# Patient Record
Sex: MALE | Race: WHITE | Employment: FULL TIME | URBAN - METROPOLITAN AREA
[De-identification: names, ages, dates, MRNs, and addresses within clinical notes are randomized per-mention and may not be internally consistent; named-entity substitution may affect disease eponyms.]

---

## 2020-11-16 ENCOUNTER — APPOINTMENT (OUTPATIENT)
Dept: GENERAL RADIOLOGY | Age: 30
DRG: 871 | End: 2020-11-16
Payer: COMMERCIAL

## 2020-11-16 ENCOUNTER — HOSPITAL ENCOUNTER (INPATIENT)
Age: 30
LOS: 4 days | Discharge: HOME | DRG: 871 | End: 2020-11-20
Attending: EMERGENCY MEDICINE | Admitting: INTERNAL MEDICINE
Payer: COMMERCIAL

## 2020-11-16 PROBLEM — U07.1 PNEUMONIA DUE TO COVID-19 VIRUS: Status: ACTIVE | Noted: 2020-11-16

## 2020-11-16 PROBLEM — J12.82 PNEUMONIA DUE TO COVID-19 VIRUS: Status: ACTIVE | Noted: 2020-11-16

## 2020-11-16 PROBLEM — E87.1 HYPONATREMIA: Status: ACTIVE | Noted: 2020-11-16

## 2020-11-16 PROBLEM — R50.9 FEBRILE ILLNESS: Status: ACTIVE | Noted: 2020-11-16

## 2020-11-16 PROBLEM — D69.6 THROMBOCYTOPENIA (HCC): Status: ACTIVE | Noted: 2020-11-16

## 2020-11-16 LAB
ABSOLUTE EOS #: <0.03 K/UL (ref 0–0.44)
ABSOLUTE IMMATURE GRANULOCYTE: <0.03 K/UL (ref 0–0.3)
ABSOLUTE LYMPH #: 1.03 K/UL (ref 1.1–3.7)
ABSOLUTE MONO #: 0.28 K/UL (ref 0.1–1.2)
ALBUMIN SERPL-MCNC: 3.7 G/DL (ref 3.5–5.2)
ALBUMIN/GLOBULIN RATIO: 1.4 (ref 1–2.5)
ALP BLD-CCNC: 53 U/L (ref 40–129)
ALT SERPL-CCNC: 38 U/L (ref 5–41)
ANION GAP SERPL CALCULATED.3IONS-SCNC: 12 MMOL/L (ref 9–17)
AST SERPL-CCNC: 47 U/L
BASOPHILS # BLD: 0 % (ref 0–2)
BASOPHILS ABSOLUTE: <0.03 K/UL (ref 0–0.2)
BILIRUB SERPL-MCNC: 0.79 MG/DL (ref 0.3–1.2)
BILIRUBIN DIRECT: 0.23 MG/DL
BILIRUBIN, INDIRECT: 0.56 MG/DL (ref 0–1)
BUN BLDV-MCNC: 7 MG/DL (ref 6–20)
BUN/CREAT BLD: ABNORMAL (ref 9–20)
CALCIUM SERPL-MCNC: 8.4 MG/DL (ref 8.6–10.4)
CHLORIDE BLD-SCNC: 98 MMOL/L (ref 98–107)
CO2: 21 MMOL/L (ref 20–31)
CREAT SERPL-MCNC: 0.7 MG/DL (ref 0.7–1.2)
D-DIMER QUANTITATIVE: 0.76 MG/L FEU
DIFFERENTIAL TYPE: ABNORMAL
EOSINOPHILS RELATIVE PERCENT: 0 % (ref 1–4)
FERRITIN: 2740 UG/L (ref 30–400)
FIBRINOGEN: 484 MG/DL (ref 140–420)
GFR AFRICAN AMERICAN: >60 ML/MIN
GFR NON-AFRICAN AMERICAN: >60 ML/MIN
GFR SERPL CREATININE-BSD FRML MDRD: ABNORMAL ML/MIN/{1.73_M2}
GFR SERPL CREATININE-BSD FRML MDRD: ABNORMAL ML/MIN/{1.73_M2}
GLOBULIN: ABNORMAL G/DL (ref 1.5–3.8)
GLUCOSE BLD-MCNC: 120 MG/DL (ref 70–99)
HCT VFR BLD CALC: 45.9 % (ref 40.7–50.3)
HEMOGLOBIN: 16.5 G/DL (ref 13–17)
IMMATURE GRANULOCYTES: 0 %
LACTIC ACID, WHOLE BLOOD: 1.3 MMOL/L (ref 0.7–2.1)
LYMPHOCYTES # BLD: 23 % (ref 24–43)
MCH RBC QN AUTO: 30.5 PG (ref 25.2–33.5)
MCHC RBC AUTO-ENTMCNC: 35.9 G/DL (ref 28.4–34.8)
MCV RBC AUTO: 84.8 FL (ref 82.6–102.9)
MONOCYTES # BLD: 6 % (ref 3–12)
NRBC AUTOMATED: 0 PER 100 WBC
PDW BLD-RTO: 11.7 % (ref 11.8–14.4)
PLATELET # BLD: ABNORMAL K/UL (ref 138–453)
PLATELET ESTIMATE: ABNORMAL
PLATELET, FLUORESCENCE: 111 K/UL (ref 138–453)
PLATELET, IMMATURE FRACTION: 4.1 % (ref 1.1–10.3)
PMV BLD AUTO: ABNORMAL FL (ref 8.1–13.5)
POTASSIUM SERPL-SCNC: 3.7 MMOL/L (ref 3.7–5.3)
RBC # BLD: 5.41 M/UL (ref 4.21–5.77)
RBC # BLD: ABNORMAL 10*6/UL
SARS-COV-2, RAPID: DETECTED
SARS-COV-2: ABNORMAL
SARS-COV-2: ABNORMAL
SEG NEUTROPHILS: 70 % (ref 36–65)
SEGMENTED NEUTROPHILS ABSOLUTE COUNT: 3.14 K/UL (ref 1.5–8.1)
SODIUM BLD-SCNC: 131 MMOL/L (ref 135–144)
SOURCE: ABNORMAL
TOTAL PROTEIN: 6.4 G/DL (ref 6.4–8.3)
TROPONIN INTERP: NORMAL
TROPONIN T: NORMAL NG/ML
TROPONIN, HIGH SENSITIVITY: <6 NG/L (ref 0–22)
WBC # BLD: 4.5 K/UL (ref 3.5–11.3)
WBC # BLD: ABNORMAL 10*3/UL

## 2020-11-16 PROCEDURE — 85055 RETICULATED PLATELET ASSAY: CPT

## 2020-11-16 PROCEDURE — 6370000000 HC RX 637 (ALT 250 FOR IP): Performed by: FAMILY MEDICINE

## 2020-11-16 PROCEDURE — 71045 X-RAY EXAM CHEST 1 VIEW: CPT

## 2020-11-16 PROCEDURE — 99222 1ST HOSP IP/OBS MODERATE 55: CPT | Performed by: FAMILY MEDICINE

## 2020-11-16 PROCEDURE — 85025 COMPLETE CBC W/AUTO DIFF WBC: CPT

## 2020-11-16 PROCEDURE — 2580000003 HC RX 258: Performed by: FAMILY MEDICINE

## 2020-11-16 PROCEDURE — 99284 EMERGENCY DEPT VISIT MOD MDM: CPT

## 2020-11-16 PROCEDURE — 80076 HEPATIC FUNCTION PANEL: CPT

## 2020-11-16 PROCEDURE — 85379 FIBRIN DEGRADATION QUANT: CPT

## 2020-11-16 PROCEDURE — 6360000002 HC RX W HCPCS: Performed by: STUDENT IN AN ORGANIZED HEALTH CARE EDUCATION/TRAINING PROGRAM

## 2020-11-16 PROCEDURE — 80048 BASIC METABOLIC PNL TOTAL CA: CPT

## 2020-11-16 PROCEDURE — 96374 THER/PROPH/DIAG INJ IV PUSH: CPT

## 2020-11-16 PROCEDURE — 2580000003 HC RX 258: Performed by: INTERNAL MEDICINE

## 2020-11-16 PROCEDURE — 6360000002 HC RX W HCPCS: Performed by: FAMILY MEDICINE

## 2020-11-16 PROCEDURE — 82728 ASSAY OF FERRITIN: CPT

## 2020-11-16 PROCEDURE — U0002 COVID-19 LAB TEST NON-CDC: HCPCS

## 2020-11-16 PROCEDURE — 2060000000 HC ICU INTERMEDIATE R&B

## 2020-11-16 PROCEDURE — 6370000000 HC RX 637 (ALT 250 FOR IP): Performed by: STUDENT IN AN ORGANIZED HEALTH CARE EDUCATION/TRAINING PROGRAM

## 2020-11-16 PROCEDURE — 85384 FIBRINOGEN ACTIVITY: CPT

## 2020-11-16 PROCEDURE — 83605 ASSAY OF LACTIC ACID: CPT

## 2020-11-16 PROCEDURE — 99255 IP/OBS CONSLTJ NEW/EST HI 80: CPT | Performed by: INTERNAL MEDICINE

## 2020-11-16 PROCEDURE — 2500000003 HC RX 250 WO HCPCS: Performed by: INTERNAL MEDICINE

## 2020-11-16 PROCEDURE — 2580000003 HC RX 258: Performed by: STUDENT IN AN ORGANIZED HEALTH CARE EDUCATION/TRAINING PROGRAM

## 2020-11-16 PROCEDURE — 84484 ASSAY OF TROPONIN QUANT: CPT

## 2020-11-16 PROCEDURE — 93005 ELECTROCARDIOGRAM TRACING: CPT | Performed by: STUDENT IN AN ORGANIZED HEALTH CARE EDUCATION/TRAINING PROGRAM

## 2020-11-16 RX ORDER — ONDANSETRON 2 MG/ML
4 INJECTION INTRAMUSCULAR; INTRAVENOUS EVERY 6 HOURS PRN
Status: DISCONTINUED | OUTPATIENT
Start: 2020-11-16 | End: 2020-11-20 | Stop reason: HOSPADM

## 2020-11-16 RX ORDER — KETOROLAC TROMETHAMINE 30 MG/ML
30 INJECTION, SOLUTION INTRAMUSCULAR; INTRAVENOUS ONCE
Status: COMPLETED | OUTPATIENT
Start: 2020-11-16 | End: 2020-11-16

## 2020-11-16 RX ORDER — 0.9 % SODIUM CHLORIDE 0.9 %
1000 INTRAVENOUS SOLUTION INTRAVENOUS ONCE
Status: COMPLETED | OUTPATIENT
Start: 2020-11-16 | End: 2020-11-16

## 2020-11-16 RX ORDER — ACETAMINOPHEN 650 MG/1
650 SUPPOSITORY RECTAL EVERY 6 HOURS PRN
Status: DISCONTINUED | OUTPATIENT
Start: 2020-11-16 | End: 2020-11-20 | Stop reason: HOSPADM

## 2020-11-16 RX ORDER — GUAIFENESIN DEXTROMETHORPHAN HYDROBROMIDE ORAL SOLUTION 10; 100 MG/5ML; MG/5ML
5 SOLUTION ORAL EVERY 4 HOURS PRN
Status: DISCONTINUED | OUTPATIENT
Start: 2020-11-16 | End: 2020-11-20 | Stop reason: HOSPADM

## 2020-11-16 RX ORDER — SODIUM CHLORIDE 0.9 % (FLUSH) 0.9 %
10 SYRINGE (ML) INJECTION EVERY 12 HOURS SCHEDULED
Status: DISCONTINUED | OUTPATIENT
Start: 2020-11-16 | End: 2020-11-20 | Stop reason: HOSPADM

## 2020-11-16 RX ORDER — POLYETHYLENE GLYCOL 3350 17 G/17G
17 POWDER, FOR SOLUTION ORAL DAILY PRN
Status: DISCONTINUED | OUTPATIENT
Start: 2020-11-16 | End: 2020-11-20 | Stop reason: HOSPADM

## 2020-11-16 RX ORDER — 0.9 % SODIUM CHLORIDE 0.9 %
30 INTRAVENOUS SOLUTION INTRAVENOUS PRN
Status: DISCONTINUED | OUTPATIENT
Start: 2020-11-16 | End: 2020-11-20 | Stop reason: HOSPADM

## 2020-11-16 RX ORDER — MAGNESIUM SULFATE 1 G/100ML
1 INJECTION INTRAVENOUS PRN
Status: DISCONTINUED | OUTPATIENT
Start: 2020-11-16 | End: 2020-11-20 | Stop reason: HOSPADM

## 2020-11-16 RX ORDER — ACETAMINOPHEN 325 MG/1
650 TABLET ORAL ONCE
Status: COMPLETED | OUTPATIENT
Start: 2020-11-16 | End: 2020-11-16

## 2020-11-16 RX ORDER — PROMETHAZINE HYDROCHLORIDE 12.5 MG/1
12.5 TABLET ORAL EVERY 6 HOURS PRN
Status: DISCONTINUED | OUTPATIENT
Start: 2020-11-16 | End: 2020-11-20 | Stop reason: HOSPADM

## 2020-11-16 RX ORDER — POTASSIUM CHLORIDE 20 MEQ/1
40 TABLET, EXTENDED RELEASE ORAL PRN
Status: DISCONTINUED | OUTPATIENT
Start: 2020-11-16 | End: 2020-11-20 | Stop reason: HOSPADM

## 2020-11-16 RX ORDER — OMEPRAZOLE 20 MG/1
20 CAPSULE, DELAYED RELEASE ORAL DAILY
COMMUNITY

## 2020-11-16 RX ORDER — ACETAMINOPHEN 325 MG/1
650 TABLET ORAL EVERY 6 HOURS PRN
Status: DISCONTINUED | OUTPATIENT
Start: 2020-11-16 | End: 2020-11-20 | Stop reason: HOSPADM

## 2020-11-16 RX ORDER — SODIUM CHLORIDE 0.9 % (FLUSH) 0.9 %
10 SYRINGE (ML) INJECTION PRN
Status: DISCONTINUED | OUTPATIENT
Start: 2020-11-16 | End: 2020-11-20 | Stop reason: HOSPADM

## 2020-11-16 RX ORDER — POTASSIUM CHLORIDE 7.45 MG/ML
10 INJECTION INTRAVENOUS PRN
Status: DISCONTINUED | OUTPATIENT
Start: 2020-11-16 | End: 2020-11-20 | Stop reason: HOSPADM

## 2020-11-16 RX ADMIN — SODIUM CHLORIDE 1000 ML: 9 INJECTION, SOLUTION INTRAVENOUS at 11:03

## 2020-11-16 RX ADMIN — KETOROLAC TROMETHAMINE 30 MG: 30 INJECTION, SOLUTION INTRAMUSCULAR at 11:02

## 2020-11-16 RX ADMIN — REMDESIVIR 200 MG: 100 INJECTION, POWDER, LYOPHILIZED, FOR SOLUTION INTRAVENOUS at 18:46

## 2020-11-16 RX ADMIN — SODIUM CHLORIDE, PRESERVATIVE FREE 10 ML: 5 INJECTION INTRAVENOUS at 20:30

## 2020-11-16 RX ADMIN — ACETAMINOPHEN 650 MG: 325 TABLET ORAL at 16:45

## 2020-11-16 RX ADMIN — ACETAMINOPHEN 650 MG: 325 TABLET ORAL at 23:00

## 2020-11-16 RX ADMIN — ENOXAPARIN SODIUM 30 MG: 30 INJECTION SUBCUTANEOUS at 16:45

## 2020-11-16 RX ADMIN — ACETAMINOPHEN 650 MG: 325 TABLET ORAL at 11:02

## 2020-11-16 SDOH — HEALTH STABILITY: PHYSICAL HEALTH: ON AVERAGE, HOW MANY MINUTES DO YOU ENGAGE IN EXERCISE AT THIS LEVEL?: 20 MIN

## 2020-11-16 SDOH — HEALTH STABILITY: PHYSICAL HEALTH: ON AVERAGE, HOW MANY DAYS PER WEEK DO YOU ENGAGE IN MODERATE TO STRENUOUS EXERCISE (LIKE A BRISK WALK)?: 2 DAYS

## 2020-11-16 SDOH — HEALTH STABILITY: MENTAL HEALTH: HOW OFTEN DO YOU HAVE A DRINK CONTAINING ALCOHOL?: 2-4 TIMES A MONTH

## 2020-11-16 ASSESSMENT — ENCOUNTER SYMPTOMS
BACK PAIN: 0
CONSTIPATION: 0
SHORTNESS OF BREATH: 1
COLOR CHANGE: 1
CHEST TIGHTNESS: 0
COUGH: 1
VOMITING: 0
NAUSEA: 0
ABDOMINAL PAIN: 0
RHINORRHEA: 0
WHEEZING: 0
DIARRHEA: 0
CHOKING: 0
PHOTOPHOBIA: 0
APNEA: 0
EYE ITCHING: 0
SINUS PRESSURE: 0
VOICE CHANGE: 0
COUGH: 0
DIARRHEA: 1

## 2020-11-16 ASSESSMENT — PAIN SCALES - GENERAL
PAINLEVEL_OUTOF10: 8
PAINLEVEL_OUTOF10: 0

## 2020-11-16 NOTE — ED TRIAGE NOTES
Pt to ED c/o SOB and chest pain. Pt stated he has positive Covid swab 5 days ago, symptoms began 8 days ago. Pt stated symptoms are worse today. Pt is febrile upon arrival and tachycardic. Pt stated he's been taking tylenol/motrin for fever without symptom relief. Pt has nonproductive cough. Pt denies abd pain or nausea/vomiting. Pt spO2 at 94% on room air, dropping to 90% with exertion. Pt resting on stretcher, NAD noted. Dr. Poly Capellan at bedside.

## 2020-11-16 NOTE — ED PROVIDER NOTES
[facility]  ED  Emergency Department Encounter  Emergency Medicine Resident     Pt Name: Lela Richards  MRN: [id number]  [place] 1990  Date of evaluation: 11/16/20  PCP:  No primary care provider on file. CHIEF COMPLAINT       Chief Complaint   Patient presents with    Shortness of Breath    Chest Pain       HISTORY OFPRESENT ILLNESS  (Location/Symptom, Timing/Onset, Context/Setting, Quality, Duration, Modifying Factors,Severity.)      Lela Richards is a 26-year-old male who presents with shortness of breath and chest pain. The patient reports that he had a positive Covid swab about 5 days ago. Says since then he is having worsening shortness of breath and right-sided chest pain. He has been taking Tylenol and Motrin at home for fever relief but reports his fevers have been as high as 103 at home. Is also taken NyQuil for symptomatic relief. No recent travel. No sick contacts at home. He does have intermittent productive cough and loose stools. No recent surgeries. PAST MEDICAL / SURGICAL / SOCIAL / FAMILY HISTORY      has a past medical history of Hx SBO. has a past surgical history that includes Tonsillectomy and adenoidectomy (1996); Appendectomy (2005); and Abdominal exploration surgery (2016). Social History     Socioeconomic History    Marital status:      Spouse name: Not on file    Number of children: Not on file    Years of education: Not on file    Highest education level: Not on file   Occupational History    Occupation: MANAGER     Employer: [employer] Financial resource strain: Not on file    Food insecurity     Worry: Not on file     Inability: Not on file   [email or web address] needs     Medical: Not on file     Non-medical: Not on file   Tobacco Use    Smoking status: Never Smoker    Smokeless tobacco: Never Used   Substance and Sexual Activity    Alcohol use:  Yes     Alcohol/week: 2.0 - 3.0 standard drinks     Types: 2 - 3 °F (39.9 °C) Oral 132 21 96 %         Physical Exam  Constitutional:       General: He is not in acute distress. Appearance: He is not diaphoretic. HENT:      Mouth/Throat:      Mouth: Mucous membranes are moist.   Eyes:      Extraocular Movements: Extraocular movements intact. Conjunctiva/sclera: Conjunctivae normal.      Pupils: Pupils are equal, round, and reactive to light. Neck:      Musculoskeletal: Neck supple. Cardiovascular:      Rate and Rhythm: Regular rhythm. Tachycardia present. Pulses: Normal pulses. Pulmonary:      Effort: Pulmonary effort is normal.      Breath sounds: No wheezing. Comments: Bilateral rhonchi  Skin:     General: Skin is warm. Neurological:      Mental Status: He is alert and oriented to person, place, and time.          DIFFERENTIAL  DIAGNOSIS     PLAN (LABS / IMAGING / EKG):  Orders Placed This Encounter   Procedures    XR CHEST PORTABLE    CBC WITH AUTO DIFFERENTIAL    BASIC METABOLIC PANEL    BZDVY-13    LACTIC ACID, WHOLE BLOOD    Troponin    Immature Platelet Fraction    Basic Metabolic Panel w/ Reflex to MG    CBC    Protime-INR    D-Dimer, Quantitative    DIET GENERAL;    Vital signs per unit routine    Notify physician    Up with assistance    Daily weights    Intake and output    Place intermittent pneumatic compression device    PPE Instructions    Telemetry Monitoring    Full Code    Inpatient consult to Hospitalist    Inpatient consult to Infectious Diseases    Droplet Plus Isolation    Initiate Oxygen Therapy Protocol    Pulse Oximetry Spot Check    EKG 12 Lead    PATIENT STATUS (FROM ED OR OR/PROCEDURAL) Inpatient       MEDICATIONS ORDERED:  Orders Placed This Encounter   Medications    0.9 % sodium chloride bolus    ketorolac (TORADOL) injection 30 mg    acetaminophen (TYLENOL) tablet 650 mg    sodium chloride flush 0.9 % injection 10 mL    sodium chloride flush 0.9 % injection 10 mL    OR Linked Order Group  potassium chloride (KLOR-CON M) extended release tablet 40 mEq     potassium bicarb-citric acid (EFFER-K) effervescent tablet 40 mEq     potassium chloride 10 mEq/100 mL IVPB (Peripheral Line)    magnesium sulfate 1 g in dextrose 5% 100 mL IVPB    OR Linked Order Group     acetaminophen (TYLENOL) tablet 650 mg     acetaminophen (TYLENOL) suppository 650 mg    polyethylene glycol (GLYCOLAX) packet 17 g    OR Linked Order Group     promethazine (PHENERGAN) tablet 12.5 mg     ondansetron (ZOFRAN) injection 4 mg    enoxaparin (LOVENOX) injection 30 mg    enoxaparin (LOVENOX) injection 30 mg    dextromethorphan-guaiFENesin (ROBITUSSIN-DM)  MG/5ML liquid 5 mL         Initial MDM/Plan: 26 y.o. male who presents with shortness of breath, fever at home and chest pain. Patient on arrival saturating low 90s. He was tachycardic to the 120s. Febrile to 103.8. On physical exam, the patient is able to complete full sentences. Lungs have bilateral rhonchi. No lower extremity edema. Intact distal pulses. Given the patient's fever, tachycardia and low sats, will get basic lab work and chest x-ray. We will repeat Covid swab here as the patient likely requires admission. We will give the patient Toradol, acetaminophen and normal saline bolus for symptomatic relief. The patient did desat to 89% on room air. Only able to march in place for approximately 15 seconds before becoming tachycardic to 140s and dyspneic.     DIAGNOSTIC RESULTS / EMERGENCYDEPARTMENT COURSE / MDM     LABS:  Labs Reviewed   CBC WITH AUTO DIFFERENTIAL - Abnormal; Notable for the following components:       Result Value    MCHC 35.9 (*)     RDW 11.7 (*)     Seg Neutrophils 70 (*)     Lymphocytes 23 (*)     Eosinophils % 0 (*)     Absolute Lymph # 1.03 (*)     All other components within normal limits   BASIC METABOLIC PANEL - Abnormal; Notable for the following components:    Glucose 120 (*)     Calcium 8.4 (*)     Sodium 131 (*) All other components within normal limits   COVID-19 - Abnormal; Notable for the following components:    SARS-CoV-2, Rapid DETECTED (*)     All other components within normal limits   IMMATURE PLATELET FRACTION - Abnormal; Notable for the following components:    Platelet, Fluorescence 111 (*)     All other components within normal limits   LACTIC ACID, WHOLE BLOOD   TROPONIN   D-DIMER, QUANTITATIVE         RADIOLOGY:  Xr Chest Portable    Result Date: 11/16/2020  EXAMINATION: ONE XRAY VIEW OF THE CHEST 11/16/2020 11:50 am COMPARISON: None. HISTORY: ORDERING SYSTEM PROVIDED HISTORY: covid+ TECHNOLOGIST PROVIDED HISTORY: covid+ FINDINGS: The heart size is at the upper limits of normal.  Bilateral ill-defined pulmonary opacities. No pneumothorax. No pleural effusion. Bilateral ill-defined pulmonary opacities suggesting atypical pneumonia         EKG  EKG Interpretation    Interpreted by emergency department physician    Rhythm: normal sinus   Rate: tachycardia  Axis: normal  Ectopy: none  Conduction: normal  ST Segments: no acute change  T Waves: no acute change  Q Waves: none    Clinical Impression: no acute changes    [place]      All EKG's are interpreted by the Emergency Department Physicianwho either signs or Co-signs this chart in the absence of a cardiologist.    EMERGENCY DEPARTMENT COURSE:      Covid swab positive. Chest x-ray showing bilateral pneumonia. Patient saturations remained low 90s. He also remained febrile to 101.8 after Tylenol and Toradol. To receive a normal saline bolus with improvement in his heart rate to the low 100s. Will admit patient to [facility].     PROCEDURES:  None    CONSULTS:  IP CONSULT TO HOSPITALIST  IP CONSULT TO INFECTIOUS DISEASES    CRITICAL CARE:  Please see attending note    FINAL IMPRESSION      1. COVID-19 virus infection          DISPOSITION / [address] Admitted 11/16/2020 01:08:39 PM      PATIENT REFERRED TO:  No follow-up provider specified.     DISCHARGE MEDICATIONS:  Current Discharge Medication List          Nemo Bella MD  Emergency Medicine Resident    (Please note that portions of this note were completed with a voice recognition program.Efforts were made to edit the dictations but occasionally words are mis-transcribed.)        Nemo Bella MD  Resident  11/16/20 1168

## 2020-11-16 NOTE — CONSULTS
[facility] -   Infectious diseases evaluation  admission date 11/16/2020    reason for consultation:   covid pneumonia     Impression :   Current:  · covid pneumonia w hypoxia -    Other:  ·   Discussion / summary of stay / plan of care   ·   Recommendations   · Get inflammatory markers and LFT  · Start 5 days remdesevir  · FU LFT  · Came day 8 of illness -might benefit from steroids and plasma if inflamm markers elevated  · Disc w pt and RN    Infection Control Recommendations   · [place] Precautions  · Contact Isolation   · Airborne isolation  · Droplet Isolation      Antimicrobial Stewardship Recommendations   · Simplification of therapy  · Targeted therapy    Coordination ofOutpatient Care:   · Estimated Length of IV antimicrobials:  · Patient will need Midline / picc Catheter Insertion:   · Patient will need SNF:  · Patient will need outpatient wound care:     History of Present Illness:   Initial history:  Nancy Osman is a 26y.o.-year-old male SOB and chest pain fever x 7 days. Fatigue poor appetite and COVOID test + 4 days PTA.   Initially had HA then resolved - no diarrhea  Due to fatigue and feeling worse, admitted to ER - after moving So2 drops to 30 -89%  Lymphopenia on exam  bilat atypical pneumonia on CXR  Admitted  We are starting Remdesevir      Interval changes  11/16/2020   Patient Vitals for the past 8 hrs:   BP Temp Temp src Pulse Resp SpO2 Height Weight   11/16/20 1643 -- 101.8 °F (38.8 °C) Oral -- -- -- -- --   11/16/20 1453 -- -- -- 91 -- -- -- --   11/16/20 1449 114/66 -- -- 91 -- -- -- --   11/16/20 1430 114/66 100 °F (37.8 °C) Oral 91 13 90 % 5' 10\" (1.778 m) 244 lb 12.8 oz (111 kg)   11/16/20 1400 115/81 -- -- 93 29 90 % -- --   11/16/20 1230 118/81 101.8 °F (38.8 °C) -- 103 19 91 % -- --   11/16/20 1145 120/89 -- -- 108 19 92 % -- --   11/16/20 1115 124/86 -- -- 129 27 97 % -- --   11/16/20 1045 127/88 103.4 °F (39.7 °C) Oral 111 21 93 % -- --   11/16/20 1032 -- 103.8 °F (39.9 °C) Oral 132 -- 96 % -- --       Summary of relevant labs:  Labs:    Micro:    Imaging:      I have personally reviewed the past medical history, past surgical history, medications, social history, and family history, and I haveupdated the database accordingly. Allergies:   Penicillins     Review of Systems:     Review of Systems   Constitutional: Positive for activity change, appetite change, fatigue and fever. HENT: Positive for congestion. Eyes: Negative for photophobia and itching. Respiratory: Positive for cough and shortness of breath. Negative for apnea. Cardiovascular: Positive for chest pain. Gastrointestinal: Negative for abdominal pain. Endocrine: Negative for polyuria. Genitourinary: Negative for dysuria. Musculoskeletal: Negative for arthralgias. Skin: Positive for color change. Allergic/Immunologic: Negative for immunocompromised state. Neurological: Negative for dizziness. Hematological: Negative for adenopathy. Psychiatric/Behavioral: Negative for agitation. Physical Examination :       Physical Exam  Constitutional:       General: He is not in acute distress. Appearance: Normal appearance. He is ill-appearing. HENT:      Head: Normocephalic and atraumatic. Nose: Nose normal. No congestion. Mouth/Throat:      Mouth: Mucous membranes are moist.   Eyes:      Conjunctiva/sclera: Conjunctivae normal.   Neck:      Musculoskeletal: Neck supple. No neck rigidity. Cardiovascular:      Rate and Rhythm: Normal rate and regular rhythm. Heart sounds: Normal heart sounds. No friction rub. Pulmonary:      Effort: No respiratory distress. Breath sounds: Normal breath sounds. Abdominal:      Palpations: Abdomen is soft. Tenderness: There is no abdominal tenderness. Genitourinary:     Comments: No colon  Musculoskeletal:         General: No swelling or tenderness. Skin:     General: Skin is dry. Coloration: Skin is not jaundiced. Neurological:      General: No focal deficit present. Mental Status: He is alert and oriented to person, place, and time. Psychiatric:         Mood and Affect: Mood normal.         Thought Content: Thought content normal.         Past Medical History:     Past Medical History:   Diagnosis Date    Hx SBO 2006       Past Surgical  History:     Past Surgical History:   Procedure Laterality Date    ABDOMINAL EXPLORATION SURGERY  2016    SBO -    APPENDECTOMY  2005    TONSILLECTOMY AND ADENOIDECTOMY  1996       Medications:      sodium chloride flush  10 mL Intravenous 2 times per day    enoxaparin  30 mg Subcutaneous BID       Social History:     Social History     Socioeconomic History    Marital status:      Spouse name: Not on file    Number of children: Not on file    Years of education: Not on file    Highest education level: Not on file   Occupational History    Occupation: MANAGER     Employer: [employer] Financial resource strain: Not on file    Food insecurity     Worry: Not on file     Inability: Not on file   [employer] needs     Medical: Not on file     Non-medical: Not on file   Tobacco Use    Smoking status: Never Smoker    Smokeless tobacco: Never Used   Substance and Sexual Activity    Alcohol use:  Yes     Alcohol/week: 2.0 - 3.0 standard drinks     Types: 2 - 3 Cans of beer per week     Frequency: 2-4 times a month    Drug use: Never    Sexual activity: Yes     Partners: Female   Lifestyle    Physical activity     Days per week: 2 days     Minutes per session: 20 min    Stress: Not on file   Relationships    Social connections     Talks on phone: Not on file     Gets together: Not on file     Attends Sikh service: Not on file     Active member of club or organization: Not on file     Attends meetings of clubs or organizations: Not on file     Relationship status: Not on file    Intimate partner violence Fear of current or ex partner: Not on file     Emotionally abused: Not on file     Physically abused: Not on file     Forced sexual activity: Not on file   Other Topics Concern    Not on file   Social History Narrative    Not on file       Family History:     Family History   Problem Relation Age of Onset    Ovarian Cancer Paternal Aunt       Medical Decision Making:   I have independently reviewed/ordered the following labs:    CBC with Differential:   Recent Labs     11/16/20  1106   WBC 4.5   HGB 16.5   HCT 45.9   PLT See Reflexed IPF Result   LYMPHOPCT 23*   MONOPCT 6     BMP:  Recent Labs     11/16/20  1106   *   K 3.7   CL 98   CO2 21   BUN 7   CREATININE 0.70     Hepatic Function Panel: No results for input(s): PROT, LABALBU, BILIDIR, IBILI, BILITOT, ALKPHOS, ALT, AST in the last 72 hours. No results for input(s): RPR in the last 72 hours. No results for input(s): HIV in the last 72 hours. No results for input(s): BC in the last 72 hours. Lab Results   Component Value Date    CREATININE 0.70 11/16/2020    GLUCOSE 120 11/16/2020       Detailed results: Thank you for allowing us to participate in the care of this patient. Please call with questions. This note is created with the assistance of a speech recognition program.  While intending to generate adocument that actually reflects the content of the visit, the document can still have some errors including those of syntax and sound a like substitutions which may escape proof reading. It such instances, actual meaningcan be extrapolated by contextual diversion.     Peggy Kramer MD  Office: [phone]  Perfect serve / office [phone]

## 2020-11-16 NOTE — PROGRESS NOTES
Pt admitted into room 2026 for COVID Pneumonia. Pt is A&O x 4, able to GREEN x 4, no distress noted. Pt has #20 in [facility] [place], operated by [facility] from ED. Denies CP, SOB, Dizziness at this time. Pt staqtes \"I just have chest pain when I cough. \"     Pt oriented to room, answered all questions at this time. Educated about COVID and precautions. Call light in reach.

## 2020-11-16 NOTE — ED NOTES
ED to inpatient nurses report    Chief Complaint   Patient presents with    Shortness of Breath    Chest Pain      Present to ED from home  LOC: A&Ox4  Vital signs   Vitals:    11/16/20 1045 11/16/20 1115 11/16/20 1145 11/16/20 1230   BP: 127/88 124/86 120/89 118/81   Pulse: 111 129 108 103   Resp: 21 27 19 19   Temp: 103.4 °F (39.7 °C)   101.8 °F (38.8 °C)   TempSrc: Oral      SpO2: 93% 97% 92% 91%      Oxygen Baseline room air    Current needs required n/a  LDAs:   Peripheral IV 11/16/20 Right Antecubital (Active)     Mobility: Independent  Pending ED orders: n/a  Present condition: stable  Code Status: [unfilled]   Consults:  []  Hospitalist  Completed  [] yes [] no  []  Medicine  Completed  [] yes [] No  []  Cardiology  Completed  [] yes [] No  []  GI   Completed  [] yes [] No  []  Neurology  Completed  [] yes [] No  []  Nephrology Completed  [] yes [] No  []  Vascular  Completed  [] yes [] No   []  Surgery  Completed  [] yes [] No   []  Urology  Completed  [] yes [] No   []  Plastics  Completed  [] yes [] No   []  ENT  Completed  [] yes [] No   []  Other   Completed  [] yes [] No  Pertinent event(s) Pt admitted for pneumonia and COVID+. Pt c/o SOB and fevers. Pt initial temp 103.5, after meds pt down to 101.8. Pt on room air spO2 at 94%.   Pertinent event(s) n/a  Electronically signed by Arlene Rivera RN on 11/16/2020 at 1:31 PM       Arlene Rivera RN  11/16/20 [id number]

## 2020-11-16 NOTE — CARE COORDINATION
Case Management Initial Discharge Plan  Tommy Lozada,             Talked with:patienton the phone to discuss discharge plans. Information verified: address, contacts, phone number, , insurance Yes    Emergency Contact/Next of Kin name & number: galileo Soriano [phone]    PCP: Ulis Essex II  Date of last visit: 6 months ago    Insurance Provider: [facility] [place]    Discharge Planning    Living Arrangements:  Spouse/Significant Other, Children   Support Systems:  Spouse/Significant Other, Children    Home has one story    Patient able to perform ADL's:Independent    Current Services (outpatient & in home) none  DME equipment: none  DME provider: n/a    Receiving oral anticoagulation therapy? No    If indicated:   Physician managing anticoagulation treatment:   Where does patient obtain lab work for ATC treatment? Potential Assistance Needed:  N/A    Patient agreeable to home care: No  [place] of choice provided:  n/a    Prior SNF/Rehab Placement and Facility: N/A  Agreeable to SNF/Rehab: No  [place] of choice provided: n/a     Evaluation: n/a    Expected Discharge date:  20    Patient expects to be discharged to:  home  Follow Up Appointment: Best Day/ Time: Thursday AM    Transportation provider: wife  Transportation arrangements needed for discharge: No    Readmission Risk              Risk of Unplanned Readmission:        10             Does patient have a readmission risk score greater than 14?: No  If yes, follow-up appointment must be made within 7 days of discharge.      Goals of Care: breathe easier      Discharge Plan: home with family    [facility]          Electronically signed by Amol Grossman RN on 20 at 4:33 PM EST

## 2020-11-16 NOTE — H&P
[facility]  Office: [address], DO, Rudi Hernandez, DO, Marva Moreau, DO, Tomasa Trammell, DO, Fabi Tom MD, Kylie Pacheco MD, Jenna Quijano MD, Sarah Moore MD, Jiles Alpers, MD, Meredith Cerna MD, Juni Carter MD, Christine Talbert MD, Lambert Alvarado MD, Samreen Ruano, DO, Melo Wagner MD, Izzy Zarco MD, Brandy Decker, DO, Zara Bartholomew MD,  Lillian Dugan DO, Thomas Tabares MD, Bridgette Dean MD, Abhi Carter, [facility], [address], [facility], Nedra Labina, CNP, Juanis Edge, CNS, Erwin Horowitz, CNP, Arthur Ports, CNP, Valentina Mueller, CNP, Adiel Grjeni, CNP, Thong Lantigua, CNP, Thierno Webb PA-C, Kerry Hendricks, [facility], Deepthi Handley, CNP, Derrick Reed, CNP, Miguelito Willson, CNP, Michael Prabhakar, CNP, Jerry Sidhuin, [address] [address]      HISTORY AND PHYSICAL EXAMINATION            Date:   11/16/2020  Patient name:  Christian Joyce  Date of admission:  11/16/2020 10:32 AM  MRN:   [id number]  Account:  [de-identified]  YOB: 1990  PCP:    No primary care provider on file. Room:   /  Code Status:    No Order Full code     Chief Complaint:     Chief Complaint   Patient presents with    Shortness of Breath    Chest Pain       History Obtained From:     patient, electronic medical record    History of Present Illness: The patient is a 26 y.o. Non-/non  male who presents with Shortness of Breath and Chest Pain   and he is admitted to the hospital for the management of  Pneumonia due to COVID-19 virus. Patient was brought to emergency room by family after he had persistent fever for last 7 days. Patient was diagnosed with COVID-19 infection 4 days ago at [facility].  He reported high-grade fever at home and has been using Tylenol around-the-clock. Patient reported fatigue, low appetite, decrease in energy. He reported that he was progressively getting short of breath.   Patient is otherwise healthy and does not take any medications. Patient denies any allergies. He is non-smoker and does not vape. Patient drinks socially. He lives with his wife at home. Initial evaluation in emergency showed tachycardia 111, temperature 101 Fahrenheit, pulse ox low 90s and dropped to 89%, patient has been tachypneic. Lab evaluation showed hyponatremia 131, thrombocytopenia 111, lymphopenia 23. COVID-19 test was repeated and was positive. Chest x-ray showed bilateral atypical pneumonia. Past Medical History:     Past Medical History:   Diagnosis Date    Hx SBO 2006        Past Surgical History:     Past Surgical History:   Procedure Laterality Date    ABDOMINAL EXPLORATION SURGERY  2016    SBO -    APPENDECTOMY  2005    TONSILLECTOMY AND ADENOIDECTOMY  1996        Medications Prior to Admission:     Prior to Admission medications    Medication Sig Start Date End Date Taking? Authorizing Provider   omeprazole (PRILOSEC) 20 MG delayed release capsule Take 20 mg by mouth daily   Yes Historical Provider, MD        Allergies:     Patient has no allergy information on record. Social History:     Tobacco:    reports that he has never smoked. He has never used smokeless tobacco.  Alcohol:      reports current alcohol use of about 2.0 - 3.0 standard drinks of alcohol per week. Drug Use:  reports no history of drug use. Family History:     Family History   Problem Relation Age of Onset    Ovarian Cancer Paternal Aunt        Review of Systems:     Positive and Negative as described in HPI. Review of Systems   Constitutional: Positive for activity change, appetite change, fatigue and fever. Negative for unexpected weight change. HENT: Negative for congestion, nosebleeds, rhinorrhea, sinus pressure, sneezing and voice change. Respiratory: Positive for shortness of breath. Negative for cough, choking, chest tightness and wheezing. Cardiovascular: Negative for chest pain, palpitations and leg swelling. Gastrointestinal: Negative for abdominal pain, constipation, diarrhea, nausea and vomiting. Genitourinary: Negative for difficulty urinating, discharge, dysuria, frequency and testicular pain. Musculoskeletal: Negative for back pain. Skin: Negative for rash. Neurological: Negative for dizziness, weakness, light-headedness and headaches. Hematological: Does not bruise/bleed easily. Psychiatric/Behavioral: Negative for agitation, behavioral problems, confusion, self-injury, sleep disturbance and suicidal ideas. Physical Exam:   /81   Pulse 103   Temp 101.8 °F (38.8 °C)   Resp 19   SpO2 91%   Temp (24hrs), Av °F (39.4 °C), Min:101.8 °F (38.8 °C), Max:103.8 °F (39.9 °C)    No results for input(s): POCGLU in the last 72 hours. No intake or output data in the 24 hours ending 20 1314  Physical Exam  Vitals signs and nursing note reviewed. Constitutional:       General: He is not in acute distress. Appearance: He is ill-appearing and toxic-appearing. He is not diaphoretic. HENT:      Head: Normocephalic and atraumatic. Nose:      Right Sinus: No maxillary sinus tenderness or frontal sinus tenderness. Left Sinus: No maxillary sinus tenderness or frontal sinus tenderness. Mouth/Throat:      Pharynx: No oropharyngeal exudate. Eyes:      General: No scleral icterus. Conjunctiva/sclera: Conjunctivae normal.      Pupils: Pupils are equal, round, and reactive to light. Neck:      Musculoskeletal: Full passive range of motion without pain and neck supple. Thyroid: No thyromegaly. Vascular: No JVD. Cardiovascular:      Rate and Rhythm: Normal rate and regular rhythm. Pulses:           Dorsalis pedis pulses are 2+ on the right side and 2+ on the left side. Heart sounds: Normal heart sounds. No murmur. Pulmonary:      Effort: Pulmonary effort is normal.      Breath sounds: Normal breath sounds. No wheezing or rales.    Abdominal: Palpations: Abdomen is soft. There is no mass. Tenderness: There is no abdominal tenderness. Lymphadenopathy:      Head:      Right side of head: No submandibular adenopathy. Left side of head: No submandibular adenopathy. Cervical: No cervical adenopathy. Skin:     General: Skin is warm. Neurological:      Mental Status: He is alert and oriented to person, place, and time. Motor: No tremor. Psychiatric:         Behavior: Behavior is cooperative. Investigations:      Laboratory Testing:  Recent Results (from the past 24 hour(s))   COVID-19    Collection Time: 11/16/20 11:05 AM    Specimen: Other   Result Value Ref Range    SARS-CoV-2          SARS-CoV-2, Rapid DETECTED (A) Not Detected    Source . NASOPHARYNGEAL SWAB     SARS-CoV-2         CBC WITH AUTO DIFFERENTIAL    Collection Time: 11/16/20 11:06 AM   Result Value Ref Range    WBC 4.5 3.5 - 11.3 k/uL    RBC 5.41 4.21 - 5.77 m/uL    Hemoglobin 16.5 13.0 - 17.0 g/dL    Hematocrit 45.9 40.7 - 50.3 %    MCV 84.8 82.6 - 102.9 fL    MCH 30.5 25.2 - 33.5 pg    MCHC 35.9 (H) 28.4 - 34.8 g/dL    RDW 11.7 (L) 11.8 - 14.4 %    Platelets See Reflexed IPF Result 138 - 453 k/uL    MPV NOT REPORTED 8.1 - 13.5 fL    NRBC Automated 0.0 0.0 per 100 WBC    Differential Type NOT REPORTED     WBC Morphology NOT REPORTED     RBC Morphology NOT REPORTED     Platelet Estimate NOT REPORTED     Seg Neutrophils 70 (H) 36 - 65 %    Lymphocytes 23 (L) 24 - 43 %    Monocytes 6 3 - 12 %    Eosinophils % 0 (L) 1 - 4 %    Basophils 0 0 - 2 %    Immature Granulocytes 0 0 %    Segs Absolute 3.14 1.50 - 8.10 k/uL    Absolute Lymph # 1.03 (L) 1.10 - 3.70 k/uL    Absolute Mono # 0.28 0.10 - 1.20 k/uL    Absolute Eos # <0.03 0.00 - 0.44 k/uL    Basophils Absolute <0.03 0.00 - 0.20 k/uL    Absolute Immature Granulocyte <0.03 0.00 - 0.30 k/uL   BASIC METABOLIC PANEL    Collection Time: 11/16/20 11:06 AM   Result Value Ref Range    Glucose 120 (H) 70 - 99 mg/dL    BUN 7 6 - 20 mg/dL    CREATININE 0.70 0.70 - 1.20 mg/dL    Bun/Cre Ratio NOT REPORTED 9 - 20    Calcium 8.4 (L) 8.6 - 10.4 mg/dL    Sodium 131 (L) 135 - 144 mmol/L    Potassium 3.7 3.7 - 5.3 mmol/L    Chloride 98 98 - 107 mmol/L    CO2 21 20 - 31 mmol/L    Anion Gap 12 9 - 17 mmol/L    GFR Non-African American >60 >60 mL/min    GFR African American >60 >60 mL/min    GFR Comment          GFR Staging NOT REPORTED    LACTIC ACID, WHOLE BLOOD    Collection Time: 11/16/20 11:06 AM   Result Value Ref Range    Lactic Acid, Whole Blood 1.3 0.7 - 2.1 mmol/L   Immature Platelet Fraction    Collection Time: 11/16/20 11:06 AM   Result Value Ref Range    Platelet, Immature Fraction 4.1 1.1 - 10.3 %    Platelet, Fluorescence 111 (L) 138 - 453 k/uL   Troponin    Collection Time: 11/16/20 11:07 AM   Result Value Ref Range    Troponin, High Sensitivity <6 0 - 22 ng/L    Troponin T NOT REPORTED <0.03 ng/mL    Troponin Interp NOT REPORTED        Imaging/Diagonstics:    Xr Chest Portable    Result Date: 11/16/2020  Bilateral ill-defined pulmonary opacities suggesting atypical pneumonia        Assessment :      Primary Problem  Pneumonia due to COVID-19 virus    Active Hospital Problems    Diagnosis Date Noted    Pneumonia due to COVID-19 virus [U07.1, J12.89] 11/16/2020       Plan:     Patient status Admit as inpatient in the  Med/Surge in droplet plus isolation     1. Pneumonia due to COVID-19 -supportive care for now. Tylenol/NSAIDs for fever. Continuous pulse oximetry monitoring. Antitussives. ID consult. We will hold off on Decadron. Continue to evaluate for need of remdesivir. Droplet plus isolation. 2. Febrile illness -supportive care. 3. Hyponatremia -gentle hydration. 4. Thrombocytopenia -secondary to infection.   Monitor blood count    Consultations:   IP CONSULT TO HOSPITALIST  IP CONSULT TO INFECTIOUS DISEASES    Patient is admitted as inpatient status because of co-morbidities listed above, severity of signs and symptoms as outlined, requirement for current medical therapies and most importantly because of direct risk to patient if care not provided in a hospital setting. Gil Small MD  11/16/2020    Copy sent to Dr. Gibson primary care provider on file.     (Please note that portions of this note were completed with a voice recognition program. Efforts were made to edit the dictations but occasionally words are mis-transcribed.)

## 2020-11-16 NOTE — ED PROVIDER NOTES
[facility] [address] [facility]     Emergency Department     Faculty Attestation        I performed a history and physical examination of the patient and discussed management with the resident. I reviewed the residents note and agree with the documented findings and plan of care. Any areas of disagreement are noted on the chart. I was personally present for the key portions of any procedures. I have documented in the chart those procedures where I was not present during the key portions. I have reviewed the emergency nurses triage note. I agree with the chief complaint, past medical history, past surgical history, allergies, medications, social and family history as documented unless otherwise noted below. For mid-level providers such as nurse practitioners as well as physicians assistants:    I have personally seen and evaluated the patient. I find the patient's history and physical exam are consistent with NP/PA documentation. I agree with the care provided, treatment rendered, disposition, & follow-up plan. Additional findings are as noted. Vital Signs: /88   Pulse 111   Temp 103.4 °F (39.7 °C) (Oral)   Resp 21   SpO2 93%   PCP:  No primary care provider on file. Pertinent Comments:     Patient was diagnosed with Covid last Wednesday he complains of worsening symptoms of chest pain and shortness of breath. He has no other chronic medical problems but complains of continued fevers and chills and shortness of breath and general malaise. He is febrile and tachycardic here and slightly hypoxic but does not appear toxic will obtain labs, EKG, chest x-ray, anticipate admission due to hypoxia.       Critical Care  None          Byron Christianson MD  Attending Emergency Medicine Physician              Phoebe Alonso MD  11/16/20 1122

## 2020-11-17 PROBLEM — U07.1 ACUTE HYPOXEMIC RESPIRATORY FAILURE DUE TO COVID-19 (HCC): Status: ACTIVE | Noted: 2020-11-16

## 2020-11-17 PROBLEM — A41.89 SEPSIS DUE TO COVID-19 (HCC): Status: ACTIVE | Noted: 2020-11-17

## 2020-11-17 PROBLEM — J96.01 ACUTE HYPOXEMIC RESPIRATORY FAILURE DUE TO COVID-19 (HCC): Status: ACTIVE | Noted: 2020-11-16

## 2020-11-17 PROBLEM — U07.1 SEPSIS DUE TO COVID-19 (HCC): Status: ACTIVE | Noted: 2020-11-17

## 2020-11-17 LAB
ABO/RH: NORMAL
ALBUMIN SERPL-MCNC: 3.5 G/DL (ref 3.5–5.2)
ALBUMIN/GLOBULIN RATIO: 1.3 (ref 1–2.5)
ALP BLD-CCNC: 49 U/L (ref 40–129)
ALT SERPL-CCNC: 35 U/L (ref 5–41)
ANION GAP SERPL CALCULATED.3IONS-SCNC: 16 MMOL/L (ref 9–17)
AST SERPL-CCNC: 47 U/L
BILIRUB SERPL-MCNC: 0.67 MG/DL (ref 0.3–1.2)
BILIRUBIN DIRECT: 0.23 MG/DL
BILIRUBIN, INDIRECT: 0.44 MG/DL (ref 0–1)
BUN BLDV-MCNC: 8 MG/DL (ref 6–20)
BUN/CREAT BLD: ABNORMAL (ref 9–20)
C-REACTIVE PROTEIN: 54.9 MG/L (ref 0–5)
CALCIUM SERPL-MCNC: 8.3 MG/DL (ref 8.6–10.4)
CHLORIDE BLD-SCNC: 104 MMOL/L (ref 98–107)
CO2: 21 MMOL/L (ref 20–31)
CREAT SERPL-MCNC: 0.65 MG/DL (ref 0.7–1.2)
D-DIMER QUANTITATIVE: 0.6 MG/L FEU
EKG ATRIAL RATE: 116 BPM
EKG P AXIS: 48 DEGREES
EKG P-R INTERVAL: 168 MS
EKG Q-T INTERVAL: 292 MS
EKG QRS DURATION: 84 MS
EKG QTC CALCULATION (BAZETT): 405 MS
EKG R AXIS: 27 DEGREES
EKG T AXIS: 21 DEGREES
EKG VENTRICULAR RATE: 116 BPM
GFR AFRICAN AMERICAN: >60 ML/MIN
GFR NON-AFRICAN AMERICAN: >60 ML/MIN
GFR SERPL CREATININE-BSD FRML MDRD: ABNORMAL ML/MIN/{1.73_M2}
GFR SERPL CREATININE-BSD FRML MDRD: ABNORMAL ML/MIN/{1.73_M2}
GLOBULIN: ABNORMAL G/DL (ref 1.5–3.8)
GLUCOSE BLD-MCNC: 90 MG/DL (ref 70–99)
HCT VFR BLD CALC: 46.1 % (ref 40.7–50.3)
HEMOGLOBIN: 16.1 G/DL (ref 13–17)
INR BLD: 1
LACTATE DEHYDROGENASE: 505 U/L (ref 135–225)
MCH RBC QN AUTO: 30.1 PG (ref 25.2–33.5)
MCHC RBC AUTO-ENTMCNC: 34.9 G/DL (ref 28.4–34.8)
MCV RBC AUTO: 86.2 FL (ref 82.6–102.9)
NRBC AUTOMATED: 0 PER 100 WBC
PDW BLD-RTO: 11.9 % (ref 11.8–14.4)
PLATELET # BLD: ABNORMAL K/UL (ref 138–453)
PLATELET, FLUORESCENCE: 115 K/UL (ref 138–453)
PLATELET, IMMATURE FRACTION: 4 % (ref 1.1–10.3)
PMV BLD AUTO: ABNORMAL FL (ref 8.1–13.5)
POTASSIUM SERPL-SCNC: 3.8 MMOL/L (ref 3.7–5.3)
PROTHROMBIN TIME: 10.1 SEC (ref 9–12)
RBC # BLD: 5.35 M/UL (ref 4.21–5.77)
SODIUM BLD-SCNC: 141 MMOL/L (ref 135–144)
TOTAL PROTEIN: 6.1 G/DL (ref 6.4–8.3)
WBC # BLD: 5.9 K/UL (ref 3.5–11.3)

## 2020-11-17 PROCEDURE — 99232 SBSQ HOSP IP/OBS MODERATE 35: CPT | Performed by: INTERNAL MEDICINE

## 2020-11-17 PROCEDURE — 80048 BASIC METABOLIC PNL TOTAL CA: CPT

## 2020-11-17 PROCEDURE — 86900 BLOOD TYPING SEROLOGIC ABO: CPT

## 2020-11-17 PROCEDURE — 6360000002 HC RX W HCPCS: Performed by: FAMILY MEDICINE

## 2020-11-17 PROCEDURE — 85027 COMPLETE CBC AUTOMATED: CPT

## 2020-11-17 PROCEDURE — 93010 ELECTROCARDIOGRAM REPORT: CPT | Performed by: INTERNAL MEDICINE

## 2020-11-17 PROCEDURE — 83615 LACTATE (LD) (LDH) ENZYME: CPT

## 2020-11-17 PROCEDURE — 6370000000 HC RX 637 (ALT 250 FOR IP): Performed by: INTERNAL MEDICINE

## 2020-11-17 PROCEDURE — 2500000003 HC RX 250 WO HCPCS: Performed by: INTERNAL MEDICINE

## 2020-11-17 PROCEDURE — 85610 PROTHROMBIN TIME: CPT

## 2020-11-17 PROCEDURE — 2580000003 HC RX 258: Performed by: FAMILY MEDICINE

## 2020-11-17 PROCEDURE — 6370000000 HC RX 637 (ALT 250 FOR IP): Performed by: FAMILY MEDICINE

## 2020-11-17 PROCEDURE — 99233 SBSQ HOSP IP/OBS HIGH 50: CPT | Performed by: INTERNAL MEDICINE

## 2020-11-17 PROCEDURE — 2580000003 HC RX 258: Performed by: INTERNAL MEDICINE

## 2020-11-17 PROCEDURE — 80076 HEPATIC FUNCTION PANEL: CPT

## 2020-11-17 PROCEDURE — 86140 C-REACTIVE PROTEIN: CPT

## 2020-11-17 PROCEDURE — 85055 RETICULATED PLATELET ASSAY: CPT

## 2020-11-17 PROCEDURE — 2060000000 HC ICU INTERMEDIATE R&B

## 2020-11-17 PROCEDURE — 36430 TRANSFUSION BLD/BLD COMPNT: CPT

## 2020-11-17 PROCEDURE — 86901 BLOOD TYPING SEROLOGIC RH(D): CPT

## 2020-11-17 PROCEDURE — 85379 FIBRIN DEGRADATION QUANT: CPT

## 2020-11-17 PROCEDURE — 36415 COLL VENOUS BLD VENIPUNCTURE: CPT

## 2020-11-17 PROCEDURE — XW033E5 INTRODUCTION OF REMDESIVIR ANTI-INFECTIVE INTO PERIPHERAL VEIN, PERCUTANEOUS APPROACH, NEW TECHNOLOGY GROUP 5: ICD-10-PCS | Performed by: INTERNAL MEDICINE

## 2020-11-17 RX ORDER — 0.9 % SODIUM CHLORIDE 0.9 %
20 INTRAVENOUS SOLUTION INTRAVENOUS ONCE
Status: DISCONTINUED | OUTPATIENT
Start: 2020-11-17 | End: 2020-11-20 | Stop reason: HOSPADM

## 2020-11-17 RX ORDER — FUROSEMIDE 10 MG/ML
20 INJECTION INTRAMUSCULAR; INTRAVENOUS
Status: COMPLETED | OUTPATIENT
Start: 2020-11-18 | End: 2020-11-18

## 2020-11-17 RX ORDER — PANTOPRAZOLE SODIUM 40 MG/1
40 TABLET, DELAYED RELEASE ORAL
Status: DISCONTINUED | OUTPATIENT
Start: 2020-11-17 | End: 2020-11-20 | Stop reason: HOSPADM

## 2020-11-17 RX ADMIN — SODIUM CHLORIDE, PRESERVATIVE FREE 10 ML: 5 INJECTION INTRAVENOUS at 09:05

## 2020-11-17 RX ADMIN — ENOXAPARIN SODIUM 40 MG: 40 INJECTION SUBCUTANEOUS at 23:44

## 2020-11-17 RX ADMIN — ENOXAPARIN SODIUM 30 MG: 30 INJECTION SUBCUTANEOUS at 07:57

## 2020-11-17 RX ADMIN — ACETAMINOPHEN 650 MG: 325 TABLET ORAL at 06:15

## 2020-11-17 RX ADMIN — REMDESIVIR 100 MG: 5 INJECTION INTRAVENOUS at 18:53

## 2020-11-17 RX ADMIN — SODIUM CHLORIDE, PRESERVATIVE FREE 10 ML: 5 INJECTION INTRAVENOUS at 23:43

## 2020-11-17 RX ADMIN — PANTOPRAZOLE SODIUM 40 MG: 40 TABLET, DELAYED RELEASE ORAL at 15:05

## 2020-11-17 RX ADMIN — ACETAMINOPHEN 650 MG: 325 TABLET ORAL at 18:31

## 2020-11-17 ASSESSMENT — ENCOUNTER SYMPTOMS
COUGH: 1
APNEA: 0
ABDOMINAL PAIN: 0
TACHYPNEA: 1
PHOTOPHOBIA: 0
EYE REDNESS: 0
EYE ITCHING: 0
SHORTNESS OF BREATH: 1
COLOR CHANGE: 1

## 2020-11-17 ASSESSMENT — PAIN SCALES - GENERAL
PAINLEVEL_OUTOF10: 0

## 2020-11-17 NOTE — PROGRESS NOTES
Physician Progress Note      PATIENT:               Cally Beaulieu  CSN #:                  [id number]  :                       1990  ADMIT DATE:       2020 10:32 AM  DISCH DATE:  RESPONDING  PROVIDER #:        Zeinab PACK BLOOD DO          QUERY TEXT:    Pt admitted with COVID-19 and noted to have persistent fever, tachycardia,   thrombocytopenia. If possible, please document in progress notes and discharge summary if you   are evaluating and/or treating: The medical record reflects the following:  Risk Factors: 26 yr old w/ COVID pneumonia  Clinical Indicators: Tmax 39.9- 103.8, ,  fibrinogen 484, Ferritin 2740,   wbc 4.5 plat 111. desat 89% on RA  Treatment: ID consult, Remdesivir, oxygen, lab monitoring, IVF bolus x1 L,   tylenol    Thank you in advance for your time, please contact me if I can be of any   assistance. Becki Marques RN, [facility], Supervisor  [phone]  Options provided:  -- Sepsis due to COVID-19  -- COVID-19 without sepsis  -- Other - I will add my own diagnosis  -- Disagree - Not applicable / Not valid  -- Disagree - Clinically unable to determine / Unknown  -- Refer to Clinical Documentation Reviewer    PROVIDER RESPONSE TEXT:    This patient has sepsis due to COVID-19.     Query created by: Clement Landeros on 2020 10:25 AM      Electronically signed by:  Bria Tian BLOOD DO 2020 2:39 PM

## 2020-11-17 NOTE — PROGRESS NOTES
[facility]  Office: [address], DO, Gladysiliana Rasheed, DO, Farooq Garcia, DO, Anay Trammell, DO, Valiant Sicard, MD, Anna Riley MD, Gil Small MD, Reanna Palma MD, Raegan Naidu MD, Eric Peterson MD, Lucia Rob MD, Bunny Madera MD, Lambert Ruth MD, Gisel Nuno DO, Marek Gonzalez MD, Talita Park MD, Mitzy Amaro DO, Jovana Reyes MD,  Marlon Correa DO, Jada Arredondo MD, Traci Maldonado MD, Be Ernandez, [facility], [facility], [facility], Breanna Beatty, [facility], Kodak Sanchez, CNS, Kathleen Rizvi, CNP, Yashira Shafer, CNP, Lizzeth Veras, CNP, Daisy Bella, CNP, Alfa Uribe, CNP, Chapo Thompson PA-C, Marlon Harris, [facility], Jose Oconnell, CNP, Sandip Cole, CNP, Joy Hope, CNP, Maritza Cowan, CNP, Kahlil Rod, [facility]   [address] [facility]    Progress Note    11/17/2020    1:06 PM    Name:   Daily Silva  MRN:     [id number]     Acct:      [de-identified]   Room:   2026/2026-01  IP Day:  1  Admit Date:  11/16/2020 10:32 AM    PCP:   Norma Anna II  Code Status:  Full Code    Subjective:     C/C:   Chief Complaint   Patient presents with    Shortness of Breath    Chest Pain     Interval History Status: not changed. Feels \"loopy\" this am  Denies cp/n/v  Some cough and sob, requiring supplemental o2 at present  Has had high fevers     Brief History:     Per my partner: \"The patient is a 26 y.o. Non-/non  male who presents with Shortness of Breath and Chest Pain   and he is admitted to the hospital for the management of  Pneumonia due to COVID-19 virus. Patient was brought to emergency room by family after he had persistent fever for last 7 days. Patient was diagnosed with COVID-19 infection 4 days ago at [facility].  He reported high-grade fever at home and has been using Tylenol around-the-clock. Patient reported fatigue, low appetite, decrease in energy.   He reported that he use drugs. Family History:   Family History   Problem Relation Age of Onset    Ovarian Cancer Paternal Aunt        Vitals:  /82   Pulse 85   Temp 99.6 °F (37.6 °C) (Oral)   Resp 18   Ht 5' 10\" (1.778 m)   Wt 244 lb 4.3 oz (110.8 kg)   SpO2 94%   BMI 35.05 kg/m²   Temp (24hrs), Av.8 °F (38.2 °C), Min:99.4 °F (37.4 °C), Max:102.9 °F (39.4 °C)    No results for input(s): POCGLU in the last 72 hours. I/O (24Hr):     Intake/Output Summary (Last 24 hours) at 2020 1306  Last data filed at 2020 0905  Gross per 24 hour   Intake 1001 ml   Output 700 ml   Net 301 ml       Labs:  Hematology:  Recent Labs     20  11020  0622   WBC 4.5 5.9   RBC 5.41 5.35   HGB 16.5 16.1   HCT 45.9 46.1   MCV 84.8 86.2   MCH 30.5 30.1   MCHC 35.9* 34.9*   RDW 11.7* 11.9   PLT See Reflexed IPF Result See Reflexed IPF Result   MPV NOT REPORTED NOT REPORTED   CRP  --  54.9*   INR  --  1.0   DDIMER 0.76 0.60     Chemistry:  Recent Labs     20  1106 20  1107 20  0622   *  --  141   K 3.7  --  3.8   CL 98  --  104   CO2 21  --  21   GLUCOSE 120*  --  90   BUN 7  --  8   CREATININE 0.70  --  0.65*   ANIONGAP 12  --  16   LABGLOM >60  --  >60   GFRAA >60  --  >60   CALCIUM 8.4*  --  8.3*   TROPHS  --  <6  --    LACTACIDWB 1.3  --   --      Recent Labs     20  1106 20  0622   PROT 6.4 6.1*   LABALBU 3.7 3.5   AST 47* 47*   ALT 38 35   LDH  --  505*   ALKPHOS 53 49   BILITOT 0.79 0.67   BILIDIR 0.23 0.23     ABG:No results found for: POCPH, PHART, PH, POCPCO2, HLT1VTU, PCO2, POCPO2, PO2ART, PO2, POCHCO3, BUO2QBF, HCO3, NBEA, PBEA, BEART, BE, THGBART, THB, OUN2WQC, NYQN9WEJ, E0EJLLES, O2SAT, FIO2  No results found for: SPECIAL  No results found for: CULTURE    Radiology:  Xr Chest Portable    Result Date: 2020  Bilateral ill-defined pulmonary opacities suggesting atypical pneumonia       Physical Examination:        General appearance:  alert, cooperative and no distress  Mental Status:  oriented to person, place and time and normal affect  Lungs:  clear to auscultation bilaterally, normal effort  Heart:  regular rate and rhythm, no murmur  Abdomen:  soft, nontender, nondistended, normal bowel sounds, no masses, hepatomegaly, splenomegaly  Extremities:  no edema, redness, tenderness in the calves  Skin:  no gross lesions, rashes, induration    Assessment:        Hospital Problems           Last Modified POA    * (Principal) Pneumonia due to COVID-19 virus 11/16/2020 Yes    Hyponatremia 11/16/2020 Yes    Thrombocytopenia ([facility] [address] 11/16/2020 Yes    Acute hypoxemic respiratory failure due to COVID-19 ([facility] [address] 11/17/2020 Yes                Plan:        1. Added on inflammatory markers: ldh, crp  2. Would benefit from plasma  3. remdesivir started day 2/5  4. May need decadron also  5. Dc some unnecessary daily labs  6.  Supplemental o2 as needed: currently on 3L o2 guera PACK Blood, DO  11/17/2020  1:06 PM

## 2020-11-17 NOTE — PROGRESS NOTES
have personally reviewed the past medical history, past surgical history, medications, social history, and family history, and I haveupdated the database accordingly. Allergies:   Penicillins     Review of Systems:     Review of Systems   Constitutional: Positive for activity change, appetite change, fatigue and fever. HENT: Positive for congestion. Eyes: Negative for photophobia, redness and itching. Respiratory: Positive for cough and shortness of breath. Negative for apnea. Cardiovascular: Positive for chest pain. Gastrointestinal: Negative for abdominal pain. Endocrine: Negative for polyuria. Genitourinary: Negative for dysuria and flank pain. Musculoskeletal: Negative for arthralgias. Skin: Positive for color change. Allergic/Immunologic: Negative for immunocompromised state. Neurological: Negative for dizziness. Hematological: Negative for adenopathy. Psychiatric/Behavioral: Negative for agitation. Physical Examination :       Physical Exam  Constitutional:       General: He is not in acute distress. Appearance: Normal appearance. He is ill-appearing. HENT:      Head: Normocephalic and atraumatic. Nose: Nose normal. No congestion. Mouth/Throat:      Mouth: Mucous membranes are moist.   Eyes:      Conjunctiva/sclera: Conjunctivae normal.   Neck:      Musculoskeletal: Neck supple. No neck rigidity or muscular tenderness. Cardiovascular:      Rate and Rhythm: Normal rate and regular rhythm. Heart sounds: Normal heart sounds. No friction rub. Pulmonary:      Effort: No respiratory distress. Breath sounds: Normal breath sounds. Abdominal:      Palpations: Abdomen is soft. Tenderness: There is no abdominal tenderness. Genitourinary:     Comments: No colon  Musculoskeletal:         General: No swelling or tenderness. Skin:     General: Skin is dry. Coloration: Skin is not jaundiced.    Neurological:      General: No focal deficit present. Mental Status: He is alert and oriented to person, place, and time. Mental status is at baseline. Psychiatric:         Mood and Affect: Mood normal.         Thought Content: Thought content normal.         Past Medical History:     Past Medical History:   Diagnosis Date    Hx SBO 2006       Past Surgical  History:     Past Surgical History:   Procedure Laterality Date    ABDOMINAL EXPLORATION SURGERY  2016    SBO -    APPENDECTOMY  2005    TONSILLECTOMY AND ADENOIDECTOMY  1996       Medications:      sodium chloride flush  10 mL Intravenous 2 times per day    enoxaparin  30 mg Subcutaneous BID    remdesivir IVPB  100 mg Intravenous Q24H       Social History:     Social History     Socioeconomic History    Marital status:      Spouse name: Not on file    Number of children: Not on file    Years of education: Not on file    Highest education level: Not on file   Occupational History    Occupation: MANAGER     Employer: [employer] Financial resource strain: Not on file    Food insecurity     Worry: Not on file     Inability: Not on file   [employer] needs     Medical: Not on file     Non-medical: Not on file   Tobacco Use    Smoking status: Never Smoker    Smokeless tobacco: Never Used   Substance and Sexual Activity    Alcohol use:  Yes     Alcohol/week: 2.0 - 3.0 standard drinks     Types: 2 - 3 Cans of beer per week     Frequency: 2-4 times a month    Drug use: Never    Sexual activity: Yes     Partners: Female   Lifestyle    Physical activity     Days per week: 2 days     Minutes per session: 20 min    Stress: Not on file   Relationships    Social connections     Talks on phone: Not on file     Gets together: Not on file     Attends Nondenominational service: Not on file     Active member of club or organization: Not on file     Attends meetings of clubs or organizations: Not on file     Relationship status: Not on file    Intimate partner violence     Fear of current or ex partner: Not on file     Emotionally abused: Not on file     Physically abused: Not on file     Forced sexual activity: Not on file   Other Topics Concern    Not on file   Social History Narrative    Not on file       Family History:     Family History   Problem Relation Age of Onset    Ovarian Cancer Paternal Aunt       Medical Decision Making:   I have independently reviewed/ordered the following labs:    CBC with Differential:   Recent Labs     11/16/20  1106 11/17/20  0622   WBC 4.5 5.9   HGB 16.5 16.1   HCT 45.9 46.1   PLT See Reflexed IPF Result See Reflexed IPF Result   LYMPHOPCT 23*  --    MONOPCT 6  --      BMP:  Recent Labs     11/16/20  1106 11/17/20  0622   * 141   K 3.7 3.8   CL 98 104   CO2 21 21   BUN 7 8   CREATININE 0.70 0.65*     Hepatic Function Panel:   Recent Labs     11/16/20  1106 11/17/20  0622   PROT 6.4 6.1*   LABALBU 3.7 3.5   BILIDIR 0.23 0.23   IBILI 0.56 0.44   BILITOT 0.79 0.67   ALKPHOS 53 49   ALT 38 35   AST 47* 47*     No results for input(s): RPR in the last 72 hours. No results for input(s): HIV in the last 72 hours. No results for input(s): BC in the last 72 hours. Lab Results   Component Value Date    CREATININE 0.65 11/17/2020    GLUCOSE 90 11/17/2020       Detailed results: Thank you for allowing us to participate in the care of this patient. Please call with questions. This note is created with the assistance of a speech recognition program.  While intending to generate adocument that actually reflects the content of the visit, the document can still have some errors including those of syntax and sound a like substitutions which may escape proof reading. It such instances, actual meaningcan be extrapolated by contextual diversion.     Renetta Farah MD  Office: [phone]  Perfect serve / office [phone]

## 2020-11-17 NOTE — PROGRESS NOTES
Pt's temp=102.2, HR= 120's-130's. Pt not due for prn Tylenol yet. Placed ice packs behind both knees and behind pt's neck. Will continue to monitor.

## 2020-11-17 NOTE — PROGRESS NOTES
Pharmacy Note  COVID-19 Anticoagulation Adjustment    Kinza Moreno is a 26 y.o. male. Pharmacist assessment of anticoagulation in a SARS-CoV-2 positive patient. Recent Labs     11/16/20  1106 11/17/20  0622   BUN 7 8       Recent Labs     11/16/20  1106 11/17/20  0622   CREATININE 0.70 0.65*     Recent Labs     11/17/20  0622   DDIMER 0.60        Estimated Creatinine Clearance: 203 mL/min (A) (based on SCr of 0.65 mg/dL (L)). Height:   Ht Readings from Last 1 Encounters:   11/16/20 5' 10\" (1.778 m)     Weight:  Wt Readings from Last 1 Encounters:   11/17/20 244 lb 4.3 oz (110.8 kg)     BMI:  Body mass index is 35.05 kg/m².       Per the [address] [place] [address] Anticoagulation Algorithm for COVID-19 positive or clinically-suspected patients, the following adjustment has been made per P&T Guidelines:             Lovenox dose increased from 30 mg bid to 40 mg bid    Thank you,  Karma Burciaga, PharmD, WILVER, BCPS 11/17/2020 3:09 PM

## 2020-11-18 LAB
ALBUMIN SERPL-MCNC: 3.7 G/DL (ref 3.5–5.2)
ALBUMIN/GLOBULIN RATIO: 1.4 (ref 1–2.5)
ALP BLD-CCNC: 48 U/L (ref 40–129)
ALT SERPL-CCNC: 35 U/L (ref 5–41)
ANION GAP SERPL CALCULATED.3IONS-SCNC: 14 MMOL/L (ref 9–17)
AST SERPL-CCNC: 43 U/L
BILIRUB SERPL-MCNC: 0.59 MG/DL (ref 0.3–1.2)
BILIRUBIN DIRECT: 0.2 MG/DL
BILIRUBIN, INDIRECT: 0.39 MG/DL (ref 0–1)
BUN BLDV-MCNC: 9 MG/DL (ref 6–20)
BUN/CREAT BLD: ABNORMAL (ref 9–20)
CALCIUM SERPL-MCNC: 8.6 MG/DL (ref 8.6–10.4)
CHLORIDE BLD-SCNC: 99 MMOL/L (ref 98–107)
CO2: 24 MMOL/L (ref 20–31)
CREAT SERPL-MCNC: 0.57 MG/DL (ref 0.7–1.2)
GFR AFRICAN AMERICAN: >60 ML/MIN
GFR NON-AFRICAN AMERICAN: >60 ML/MIN
GFR SERPL CREATININE-BSD FRML MDRD: ABNORMAL ML/MIN/{1.73_M2}
GFR SERPL CREATININE-BSD FRML MDRD: ABNORMAL ML/MIN/{1.73_M2}
GLOBULIN: ABNORMAL G/DL (ref 1.5–3.8)
GLUCOSE BLD-MCNC: 116 MG/DL (ref 70–99)
HCT VFR BLD CALC: 43.3 % (ref 40.7–50.3)
HEMOGLOBIN: 15.2 G/DL (ref 13–17)
MCH RBC QN AUTO: 30 PG (ref 25.2–33.5)
MCHC RBC AUTO-ENTMCNC: 35.1 G/DL (ref 28.4–34.8)
MCV RBC AUTO: 85.4 FL (ref 82.6–102.9)
NRBC AUTOMATED: 0 PER 100 WBC
PDW BLD-RTO: 12 % (ref 11.8–14.4)
PLATELET # BLD: 145 K/UL (ref 138–453)
PMV BLD AUTO: 10.9 FL (ref 8.1–13.5)
POTASSIUM SERPL-SCNC: 3.5 MMOL/L (ref 3.7–5.3)
RBC # BLD: 5.07 M/UL (ref 4.21–5.77)
SODIUM BLD-SCNC: 137 MMOL/L (ref 135–144)
TOTAL PROTEIN: 6.3 G/DL (ref 6.4–8.3)
WBC # BLD: 5 K/UL (ref 3.5–11.3)

## 2020-11-18 PROCEDURE — 6370000000 HC RX 637 (ALT 250 FOR IP): Performed by: FAMILY MEDICINE

## 2020-11-18 PROCEDURE — 2580000003 HC RX 258: Performed by: INTERNAL MEDICINE

## 2020-11-18 PROCEDURE — 80076 HEPATIC FUNCTION PANEL: CPT

## 2020-11-18 PROCEDURE — 36415 COLL VENOUS BLD VENIPUNCTURE: CPT

## 2020-11-18 PROCEDURE — 80048 BASIC METABOLIC PNL TOTAL CA: CPT

## 2020-11-18 PROCEDURE — 2500000003 HC RX 250 WO HCPCS: Performed by: INTERNAL MEDICINE

## 2020-11-18 PROCEDURE — 36430 TRANSFUSION BLD/BLD COMPNT: CPT

## 2020-11-18 PROCEDURE — 6370000000 HC RX 637 (ALT 250 FOR IP): Performed by: INTERNAL MEDICINE

## 2020-11-18 PROCEDURE — 85027 COMPLETE CBC AUTOMATED: CPT

## 2020-11-18 PROCEDURE — 99233 SBSQ HOSP IP/OBS HIGH 50: CPT | Performed by: INTERNAL MEDICINE

## 2020-11-18 PROCEDURE — 6360000002 HC RX W HCPCS: Performed by: INTERNAL MEDICINE

## 2020-11-18 PROCEDURE — 6360000002 HC RX W HCPCS: Performed by: FAMILY MEDICINE

## 2020-11-18 PROCEDURE — 99232 SBSQ HOSP IP/OBS MODERATE 35: CPT | Performed by: INTERNAL MEDICINE

## 2020-11-18 PROCEDURE — 2060000000 HC ICU INTERMEDIATE R&B

## 2020-11-18 RX ADMIN — REMDESIVIR 100 MG: 5 INJECTION INTRAVENOUS at 19:38

## 2020-11-18 RX ADMIN — ENOXAPARIN SODIUM 40 MG: 40 INJECTION SUBCUTANEOUS at 19:45

## 2020-11-18 RX ADMIN — ACETAMINOPHEN 650 MG: 325 TABLET ORAL at 05:49

## 2020-11-18 RX ADMIN — PANTOPRAZOLE SODIUM 40 MG: 40 TABLET, DELAYED RELEASE ORAL at 05:49

## 2020-11-18 RX ADMIN — ENOXAPARIN SODIUM 40 MG: 40 INJECTION SUBCUTANEOUS at 08:09

## 2020-11-18 RX ADMIN — ONDANSETRON 4 MG: 2 INJECTION INTRAMUSCULAR; INTRAVENOUS at 03:45

## 2020-11-18 RX ADMIN — DEXTROMETHORPHAN HYDROBROMIDE AND GUAIFENESIN 5 ML: 20; 200 LIQUID ORAL at 05:50

## 2020-11-18 RX ADMIN — ACETAMINOPHEN 650 MG: 325 TABLET ORAL at 16:23

## 2020-11-18 RX ADMIN — POTASSIUM CHLORIDE 40 MEQ: 1500 TABLET, EXTENDED RELEASE ORAL at 09:32

## 2020-11-18 RX ADMIN — FUROSEMIDE 20 MG: 10 INJECTION, SOLUTION INTRAMUSCULAR; INTRAVENOUS at 01:33

## 2020-11-18 ASSESSMENT — PAIN SCALES - GENERAL
PAINLEVEL_OUTOF10: 4
PAINLEVEL_OUTOF10: 0
PAINLEVEL_OUTOF10: 4

## 2020-11-18 ASSESSMENT — ENCOUNTER SYMPTOMS
ABDOMINAL PAIN: 0
COLOR CHANGE: 1
APNEA: 0
PHOTOPHOBIA: 0
COUGH: 1
EYE REDNESS: 0
SHORTNESS OF BREATH: 1
EYE ITCHING: 0

## 2020-11-18 ASSESSMENT — PAIN DESCRIPTION - FREQUENCY: FREQUENCY: INTERMITTENT

## 2020-11-18 ASSESSMENT — PAIN DESCRIPTION - DESCRIPTORS: DESCRIPTORS: ACHING

## 2020-11-18 ASSESSMENT — PAIN DESCRIPTION - PAIN TYPE: TYPE: ACUTE PAIN

## 2020-11-18 ASSESSMENT — PAIN DESCRIPTION - ONSET: ONSET: GRADUAL

## 2020-11-18 ASSESSMENT — PAIN DESCRIPTION - LOCATION: LOCATION: HEAD

## 2020-11-18 ASSESSMENT — PAIN - FUNCTIONAL ASSESSMENT: PAIN_FUNCTIONAL_ASSESSMENT: ACTIVITIES ARE NOT PREVENTED

## 2020-11-18 ASSESSMENT — PAIN DESCRIPTION - PROGRESSION: CLINICAL_PROGRESSION: GRADUALLY WORSENING

## 2020-11-18 NOTE — PLAN OF CARE
Problem: Airway Clearance - Ineffective  Goal: Achieve or maintain patent airway  11/18/2020 0857 by Kyle Mills RN  Outcome: Ongoing  11/17/2020 2127 by Travis Mesa RN  Outcome: Ongoing     Problem: Gas Exchange - Impaired  Goal: Absence of hypoxia  11/18/2020 0857 by Kyle Mills RN  Outcome: Ongoing  11/17/2020 2127 by Travis Mesa RN  Outcome: Ongoing  Goal: Promote optimal lung function  11/18/2020 0857 by Kyle Mills RN  Outcome: Ongoing  11/17/2020 2127 by Travis Mesa RN  Outcome: Ongoing     Problem: Breathing Pattern - Ineffective  Goal: Ability to achieve and maintain a regular respiratory rate  11/18/2020 0857 by Kyle Mills RN  Outcome: Ongoing  11/17/2020 2127 by Travis Mesa RN  Outcome: Ongoing     Problem:  Body Temperature -  Risk of, Imbalanced  Goal: Ability to maintain a body temperature within defined limits  11/18/2020 0857 by Kyle Mills RN  Outcome: Ongoing  11/17/2020 2127 by Travis Mesa RN  Outcome: Ongoing  Goal: Will regain or maintain usual level of consciousness  11/18/2020 0857 by Kyle Mills RN  Outcome: Ongoing  11/17/2020 2127 by Travis Mesa RN  Outcome: Ongoing  Goal: Complications related to the disease process, condition or treatment will be avoided or minimized  11/18/2020 0857 by Kyle Mills RN  Outcome: Ongoing  11/17/2020 2127 by Travis Mesa RN  Outcome: Ongoing     Problem: Isolation Precautions - Risk of Spread of Infection  Goal: Prevent transmission of infection  11/18/2020 0857 by Kyle Mills RN  Outcome: Ongoing  11/17/2020 2127 by Travis Mesa RN  Outcome: Ongoing     Problem: Nutrition Deficits  Goal: Optimize nutrtional status  11/18/2020 0857 by Kyle Mills RN  Outcome: Ongoing  11/17/2020 2127 by Travis Mesa RN  Outcome: Ongoing     Problem: Risk for Fluid Volume Deficit  Goal: Maintain normal heart rhythm  11/18/2020 0857 by Kyle Mills RN  Outcome: Ongoing  11/17/2020 2127 by Tati Gan RN  Outcome: Ongoing  Goal: Maintain absence of muscle cramping  11/18/2020 0857 by Matthew Stahl RN  Outcome: Ongoing  11/17/2020 2127 by Tati Gan RN  Outcome: Ongoing  Goal: Maintain normal serum potassium, sodium, calcium, phosphorus, and pH  11/18/2020 0857 by Matthew Stahl RN  Outcome: Ongoing  11/17/2020 2127 by Tati Gan RN  Outcome: Ongoing     Problem: Loneliness or Risk for Loneliness  Goal: Demonstrate positive use of time alone when socialization is not possible  11/18/2020 0857 by Matthew Stahl RN  Outcome: Ongoing  11/17/2020 2127 by Tati Gan RN  Outcome: Ongoing     Problem: Fatigue  Goal: Verbalize increase energy and improved vitality  11/18/2020 0857 by Matthew Stahl RN  Outcome: Ongoing  11/17/2020 2127 by Tati Gan RN  Outcome: Ongoing     Problem: Patient Education: Go to Patient Education Activity  Goal: Patient/Family Education  11/18/2020 0857 by Matthew Stahl RN  Outcome: Ongoing  11/17/2020 2127 by Tati Gan RN  Outcome: Ongoing     Problem: Falls - Risk of:  Goal: Will remain free from falls  Description: Will remain free from falls  Outcome: Ongoing  Goal: Absence of physical injury  Description: Absence of physical injury  Outcome: Ongoing

## 2020-11-18 NOTE — CARE COORDINATION
Transitional planning- talked with patient on the phone. Plan is to go home with his family.  Has ride, Plasma done, remdesivir until 11-20, O2 3L per N/C, may need home O2

## 2020-11-18 NOTE — PROGRESS NOTES
Unit 2 of plasma checked with 2nd RN and is now transfusing. Pre-transfusion vitals charted. RN with patient for first 15 minutes of transfusion - patient asymptomatic so far. Vitals stable and cycling. Will continue to monitor.

## 2020-11-18 NOTE — PROGRESS NOTES
Patient consent for plasma administration verified and in chart. Unit 1 of plasma checked with 2nd RN and is now transfusing. Pre-transfusion vitals charted. RN with patient for first 15 minutes of transfusion - patient asymptomatic so far. Vitals stable and cycling. Will continue to monitor.

## 2020-11-18 NOTE — PROGRESS NOTES
[facility]  Office: [address], DO, Farhat Barragan, DO, Virgen Donovan, DO, Meera Maite Blood, DO, Alverto Williamson MD, Ghazal Campos MD, Kalie Patterson MD, Devyn Perdue MD, Onel Alva MD, Kaylen Henry MD, Bhavani Brito MD, Trena Acosta MD, Lambert Poole MD, Geraldine Esquivel, DO, Alexey Estrella MD, Patricia Toney MD, Gregorio Bhatt DO, Haleigh Pitts MD,  Lizz Acosta DO, Je Arreaga MD, Letha Gomez MD, Perla Brown, [facility], [facility] Del[facility]so, CNP, Ivanna Schrader, CNP, Shakila Mask, CNS, [place], CNP, Celine Can, CNP, Emeka Khoury, CNP, Curtis Campbell, CNP, Gerhard Colbert, CNP, Lendel Nissen, PA-C, Oren Trammell, DNP, Sharlene Zarate, CNP, Javi, CNP, Ann Donaldson, CNP, Baltazar Nieves, CNP, Vijay Diallo, CNP         [facility]   [address] [facility]    Progress Note    11/18/2020    2:49 PM    Name:   Elisha Kaufman  MRN:     [id number]     Acct:      [de-identified]   Room:   2026/2026-01  IP Day:  2  Admit Date:  11/16/2020 10:32 AM    PCP:   Mildred Fraire II  Code Status:  Full Code    Subjective:     C/C:   Chief Complaint   Patient presents with    Shortness of Breath    Chest Pain     Interval History Status: not changed. Denies cp/n/v  Some  Increased cough and sob, requiring supplemental o2 at present 3L  No further fevers but has had chills and sweats    Brief History:     Per my partner: \"The patient is a 26 y.o. Non-/non  male who presents with Shortness of Breath and Chest Pain   and he is admitted to the hospital for the management of  Pneumonia due to COVID-19 virus. Patient was brought to emergency room by family after he had persistent fever for last 7 days. Patient was diagnosed with COVID-19 infection 4 days ago at [facility].  He reported high-grade fever at home and has been using Tylenol around-the-clock. Patient reported fatigue, low appetite, decrease in energy. not use drugs. Family History:   Family History   Problem Relation Age of Onset    Ovarian Cancer Paternal Aunt        Vitals:  /66   Pulse 67   Temp 98.5 °F (36.9 °C) (Oral)   Resp 18   Ht 5' 10\" (1.778 m)   Wt 243 lb 1.6 oz (110.3 kg)   SpO2 95%   BMI 34.88 kg/m²   Temp (24hrs), Av.3 °F (37.4 °C), Min:98.3 °F (36.8 °C), Max:102 °F (38.9 °C)    No results for input(s): POCGLU in the last 72 hours. I/O (24Hr):     Intake/Output Summary (Last 24 hours) at 2020 1449  Last data filed at 2020 0530  Gross per 24 hour   Intake 752.5 ml   Output 1200 ml   Net -447.5 ml       Labs:  Hematology:  Recent Labs     20  1106 20  0622 20  0718   WBC 4.5 5.9 5.0   RBC 5.41 5.35 5.07   HGB 16.5 16.1 15.2   HCT 45.9 46.1 43.3   MCV 84.8 86.2 85.4   MCH 30.5 30.1 30.0   MCHC 35.9* 34.9* 35.1*   RDW 11.7* 11.9 12.0   PLT See Reflexed IPF Result See Reflexed IPF Result 145   MPV NOT REPORTED NOT REPORTED 10.9   CRP  --  54.9*  --    INR  --  1.0  --    DDIMER 0.76 0.60  --      Chemistry:  Recent Labs     20  1106 20  1107 20  0622 20  0718   *  --  141 137   K 3.7  --  3.8 3.5*   CL 98  --  104 99   CO2 21  --  21 24   GLUCOSE 120*  --  90 116*   BUN 7  --  8 9   CREATININE 0.70  --  0.65* 0.57*   ANIONGAP 12  --  16 14   LABGLOM >60  --  >60 >60   GFRAA >60  --  >60 >60   CALCIUM 8.4*  --  8.3* 8.6   TROPHS  --  <6  --   --    LACTACIDWB 1.3  --   --   --      Recent Labs     20  1106 20  0622 20  0718   PROT 6.4 6.1* 6.3*   LABALBU 3.7 3.5 3.7   AST 47* 47* 43*   ALT 38 35 35   LDH  --  505*  --    ALKPHOS 53 49 48   BILITOT 0.79 0.67 0.59   BILIDIR 0.23 0.23 0.20     ABG:No results found for: POCPH, PHART, PH, POCPCO2, DEZ7TZL, PCO2, POCPO2, PO2ART, PO2, POCHCO3, WPM0XST, HCO3, NBEA, PBEA, BEART, BE, THGBART, THB, JNC8OTD, DCVX5IUR, Q2AVAZJJ, O2SAT, FIO2  No results found for: SPECIAL  No results found for: CULTURE    Radiology:  Xr Chest Portable    Result Date: 11/16/2020  Bilateral ill-defined pulmonary opacities suggesting atypical pneumonia       Physical Examination:        General appearance:  alert, cooperative and no distress  Mental Status:  oriented to person, place and time and normal affect  Lungs:  clear to auscultation bilaterally, normal effort  Heart:  regular rate and rhythm, no murmur  Abdomen:  soft, nontender, nondistended, normal bowel sounds, no masses, hepatomegaly, splenomegaly  Extremities:  no edema, redness, tenderness in the calves  Skin:  no gross lesions, rashes, induration    Assessment:        Hospital Problems           Last Modified POA    * (Principal) Pneumonia due to COVID-19 virus 11/16/2020 Yes    Hyponatremia 11/16/2020 Yes    Thrombocytopenia ([facility] [address] 11/16/2020 Yes    Acute hypoxemic respiratory failure due to COVID-19 ([facility] [address] 11/17/2020 Yes    Sepsis due to COVID-19 ([facility] [address] 11/17/2020 Yes                Plan:        1. Received plasma  2. remdesivir started day 3/5  3. start decadron also  4. Dc some unnecessary daily labs  5.  Supplemental o2 as needed: currently on 3L o2 guera PACK Blood, DO  11/18/2020  2:49 PM

## 2020-11-18 NOTE — PROGRESS NOTES
[facility] -   Infectious diseases evaluation  admission date 11/16/2020    reason for consultation:   covid pneumonia     Impression :   Current:  covid pneumonia w hypoxia -  Elevated inflam markers  Other:  ·   Discussion / summary of stay / plan of care   ·   Recommendations     · 5 days remdesevir till 11/20  · FU LFT  · Came day 8 of illness-steroids and lovenox  · 11/17 Consented plasma - will order 2 U  by 20 mg lasix -( per wt) - tolerated w some nausea 11/17 - Fever improved  · Disc w pt   · Repeat ferritin in am     Infection Control Recommendations   · [place] Precautions  · Contact Isolation   · Airborne isolation  · Droplet Isolation      Antimicrobial Stewardship Recommendations   · Simplification of therapy  · Targeted therapy    Coordination ofOutpatient Care:   · Estimated Length of IV antimicrobials:  · Patient will need Midline / picc Catheter Insertion:   · Patient will need SNF:  · Patient will need outpatient wound care:     History of Present Illness:   Initial history:  Claudia Burroughs is a 26y.o.-year-old male SOB and chest pain fever x 7 days. Fatigue poor appetite and COVOID test + 4 days PTA.   Initially had HA then resolved - no diarrhea  Due to fatigue and feeling worse, admitted to ER - after moving So2 drops to 30 -89%  Lymphopenia on exam  bilat atypical pneumonia on CXR  Admitted  We are starting Remdesevir      Interval changes  11/18/2020   Patient Vitals for the past 8 hrs:   BP Temp Temp src Pulse Resp SpO2   11/18/20 1620 118/77 98.9 °F (37.2 °C) Oral 75 16 95 %   11/18/20 1238 114/66 98.5 °F (36.9 °C) Oral 67 18 95 %   no fever - took the pl;asmna last night and fever broke since  Still chills and some sweats - cough and some SOB - on O2 NC  abd soft , nauseated  So2 91%  LFT normal      Summary of relevant labs:  Labs:  WBC 5.9      LFT NL  Fibrinogen 484  Ferritin 2740  crp 54    Micro:    Imaging:      I have personally reviewed the past medical history, past surgical history, medications, social history, and family history, and I haveupdated the database accordingly. Allergies:   Penicillins     Review of Systems:     Review of Systems   Constitutional: Positive for activity change, appetite change and fatigue. Negative for fever. HENT: Positive for congestion. Eyes: Negative for photophobia, redness and itching. Respiratory: Positive for cough and shortness of breath. Negative for apnea. Cardiovascular: Negative for chest pain. Gastrointestinal: Negative for abdominal pain. Endocrine: Negative for polyuria. Genitourinary: Negative for dysuria and flank pain. Musculoskeletal: Negative for arthralgias. Skin: Positive for color change. Allergic/Immunologic: Negative for immunocompromised state. Neurological: Negative for dizziness. Hematological: Negative for adenopathy. Psychiatric/Behavioral: Negative for agitation. Physical Examination :       Physical Exam  Constitutional:       General: He is not in acute distress. Appearance: Normal appearance. He is ill-appearing. He is not toxic-appearing or diaphoretic. HENT:      Head: Normocephalic and atraumatic. Nose: Nose normal. No congestion. Mouth/Throat:      Mouth: Mucous membranes are moist.   Eyes:      Conjunctiva/sclera: Conjunctivae normal.   Neck:      Musculoskeletal: Neck supple. No neck rigidity or muscular tenderness. Cardiovascular:      Rate and Rhythm: Normal rate and regular rhythm. Heart sounds: Normal heart sounds. No friction rub. Pulmonary:      Effort: No respiratory distress. Breath sounds: Normal breath sounds. Abdominal:      Palpations: Abdomen is soft. Tenderness: There is no abdominal tenderness. Genitourinary:     Comments: No colon  Musculoskeletal:         General: No swelling or tenderness. Skin:     General: Skin is dry. Coloration: Skin is not jaundiced.

## 2020-11-19 LAB
ALBUMIN SERPL-MCNC: 3.5 G/DL (ref 3.5–5.2)
ALBUMIN/GLOBULIN RATIO: 1.2 (ref 1–2.5)
ALP BLD-CCNC: 60 U/L (ref 40–129)
ALT SERPL-CCNC: 52 U/L (ref 5–41)
AST SERPL-CCNC: 57 U/L
BILIRUB SERPL-MCNC: 0.67 MG/DL (ref 0.3–1.2)
BILIRUBIN DIRECT: 0.24 MG/DL
BILIRUBIN, INDIRECT: 0.43 MG/DL (ref 0–1)
BLD PROD TYP BPU: NORMAL
BLD PROD TYP BPU: NORMAL
DISPENSE STATUS BLOOD BANK: NORMAL
DISPENSE STATUS BLOOD BANK: NORMAL
FERRITIN: 2556 UG/L (ref 30–400)
GLOBULIN: ABNORMAL G/DL (ref 1.5–3.8)
TOTAL PROTEIN: 6.4 G/DL (ref 6.4–8.3)
TRANSFUSION STATUS: NORMAL
TRANSFUSION STATUS: NORMAL
UNIT DIVISION: 0
UNIT DIVISION: 0
UNIT NUMBER: NORMAL
UNIT NUMBER: NORMAL

## 2020-11-19 PROCEDURE — 2580000003 HC RX 258: Performed by: FAMILY MEDICINE

## 2020-11-19 PROCEDURE — 6370000000 HC RX 637 (ALT 250 FOR IP): Performed by: INTERNAL MEDICINE

## 2020-11-19 PROCEDURE — 2500000003 HC RX 250 WO HCPCS: Performed by: INTERNAL MEDICINE

## 2020-11-19 PROCEDURE — 99232 SBSQ HOSP IP/OBS MODERATE 35: CPT | Performed by: INTERNAL MEDICINE

## 2020-11-19 PROCEDURE — 2580000003 HC RX 258: Performed by: INTERNAL MEDICINE

## 2020-11-19 PROCEDURE — 6370000000 HC RX 637 (ALT 250 FOR IP): Performed by: FAMILY MEDICINE

## 2020-11-19 PROCEDURE — 2060000000 HC ICU INTERMEDIATE R&B

## 2020-11-19 PROCEDURE — 80076 HEPATIC FUNCTION PANEL: CPT

## 2020-11-19 PROCEDURE — 6360000002 HC RX W HCPCS: Performed by: FAMILY MEDICINE

## 2020-11-19 PROCEDURE — 82728 ASSAY OF FERRITIN: CPT

## 2020-11-19 PROCEDURE — 36415 COLL VENOUS BLD VENIPUNCTURE: CPT

## 2020-11-19 RX ADMIN — ENOXAPARIN SODIUM 40 MG: 40 INJECTION SUBCUTANEOUS at 20:35

## 2020-11-19 RX ADMIN — ACETAMINOPHEN 650 MG: 325 TABLET ORAL at 20:35

## 2020-11-19 RX ADMIN — PANTOPRAZOLE SODIUM 40 MG: 40 TABLET, DELAYED RELEASE ORAL at 08:42

## 2020-11-19 RX ADMIN — SODIUM CHLORIDE, PRESERVATIVE FREE 10 ML: 5 INJECTION INTRAVENOUS at 20:36

## 2020-11-19 RX ADMIN — SODIUM CHLORIDE, PRESERVATIVE FREE 10 ML: 5 INJECTION INTRAVENOUS at 08:42

## 2020-11-19 RX ADMIN — REMDESIVIR 100 MG: 5 INJECTION INTRAVENOUS at 20:35

## 2020-11-19 RX ADMIN — ENOXAPARIN SODIUM 40 MG: 40 INJECTION SUBCUTANEOUS at 08:42

## 2020-11-19 ASSESSMENT — ENCOUNTER SYMPTOMS
APNEA: 0
SHORTNESS OF BREATH: 1
PHOTOPHOBIA: 0
EYE ITCHING: 0
EYE REDNESS: 0
COUGH: 1
ABDOMINAL PAIN: 0
COLOR CHANGE: 1

## 2020-11-19 ASSESSMENT — PAIN SCALES - GENERAL
PAINLEVEL_OUTOF10: 0
PAINLEVEL_OUTOF10: 6
PAINLEVEL_OUTOF10: 0

## 2020-11-19 NOTE — PLAN OF CARE
Problem: Airway Clearance - Ineffective  Goal: Achieve or maintain patent airway  11/18/2020 2054 by Estrellita Dowd  Outcome: Ongoing  11/18/2020 0857 by Angeline Ta RN  Outcome: Ongoing     Problem: Gas Exchange - Impaired  Goal: Absence of hypoxia  11/18/2020 2054 by Estrellita Dowd  Outcome: Ongoing  11/18/2020 0857 by Angeline Ta RN  Outcome: Ongoing  Goal: Promote optimal lung function  11/18/2020 2054 by Estrellita Dowd  Outcome: Ongoing  11/18/2020 0857 by Angeline Ta RN  Outcome: Ongoing     Problem: Breathing Pattern - Ineffective  Goal: Ability to achieve and maintain a regular respiratory rate  11/18/2020 2054 by Estrellita Dowd  Outcome: Ongoing  11/18/2020 0857 by Angeline Ta RN  Outcome: Ongoing     Problem:  Body Temperature -  Risk of, Imbalanced  Goal: Ability to maintain a body temperature within defined limits  11/18/2020 2054 by Estrellita Dowd  Outcome: Ongoing  11/18/2020 0857 by Angeline Ta RN  Outcome: Ongoing  Goal: Will regain or maintain usual level of consciousness  11/18/2020 2054 by Estrellita Dowd  Outcome: Ongoing  11/18/2020 0857 by Angeline Ta RN  Outcome: Ongoing  Goal: Complications related to the disease process, condition or treatment will be avoided or minimized  11/18/2020 2054 by Estrellita Dowd  Outcome: Ongoing  11/18/2020 0857 by Angeline Ta RN  Outcome: Ongoing     Problem: Isolation Precautions - Risk of Spread of Infection  Goal: Prevent transmission of infection  11/18/2020 2054 by Estrellita Dowd  Outcome: Ongoing  11/18/2020 0857 by Angeline Ta RN  Outcome: Ongoing     Problem: Nutrition Deficits  Goal: Optimize nutrtional status  11/18/2020 2054 by Estrellita Dowd  Outcome: Ongoing  11/18/2020 0857 by Angeline Ta RN  Outcome: Ongoing     Problem: Risk for Fluid Volume Deficit  Goal: Maintain normal heart rhythm  11/18/2020 2054 by Estrellita Dowd  Outcome: Ongoing  11/18/2020 0857 by Angeline Ta RN  Outcome: Ongoing  Goal: Maintain absence of muscle cramping  11/18/2020 2054 by Erick Nuñez  Outcome: Ongoing  11/18/2020 0857 by Jonny Leyden, RN  Outcome: Ongoing  Goal: Maintain normal serum potassium, sodium, calcium, phosphorus, and pH  11/18/2020 2054 by Erick Nuñez  Outcome: Ongoing  11/18/2020 0857 by Jonny Leyden, RN  Outcome: Ongoing     Problem: Loneliness or Risk for Loneliness  Goal: Demonstrate positive use of time alone when socialization is not possible  11/18/2020 2054 by Erick Nuñez  Outcome: Ongoing  11/18/2020 0857 by Jonny Leyden, RN  Outcome: Ongoing     Problem: Fatigue  Goal: Verbalize increase energy and improved vitality  11/18/2020 2054 by Erick Nuñez  Outcome: Ongoing  11/18/2020 0857 by Jonny Leyden, RN  Outcome: Ongoing     Problem: Patient Education: Go to Patient Education Activity  Goal: Patient/Family Education  11/18/2020 2054 by Erick Nuñez  Outcome: Ongoing  11/18/2020 0857 by Jonny Leyden, RN  Outcome: Ongoing     Problem: Falls - Risk of:  Goal: Will remain free from falls  Description: Will remain free from falls  11/18/2020 2054 by Erick Nuñez  Outcome: Ongoing  11/18/2020 0857 by Jonny Leyden, RN  Outcome: Ongoing  Goal: Absence of physical injury  Description: Absence of physical injury  11/18/2020 2054 by Erick Nuñez  Outcome: Ongoing  11/18/2020 0857 by Jonny Leyden, RN  Outcome: Ongoing     Problem: Pain:  Goal: Pain level will decrease  Description: Pain level will decrease  Outcome: Ongoing  Goal: Control of acute pain  Description: Control of acute pain  Outcome: Ongoing  Goal: Control of chronic pain  Description: Control of chronic pain  Outcome: Ongoing

## 2020-11-19 NOTE — PROGRESS NOTES
medications, social history, and family history, and I haveupdated the database accordingly. Allergies:   Penicillins     Review of Systems:     Review of Systems   Constitutional: Positive for activity change and fatigue. Negative for fever. HENT: Positive for congestion. Eyes: Negative for photophobia, redness and itching. Respiratory: Positive for cough and shortness of breath. Negative for apnea. Cardiovascular: Negative for chest pain. Gastrointestinal: Negative for abdominal pain. Endocrine: Negative for polyphagia and polyuria. Genitourinary: Negative for dysuria and flank pain. Musculoskeletal: Negative for arthralgias. Skin: Positive for color change. Allergic/Immunologic: Negative for immunocompromised state. Neurological: Negative for dizziness. Hematological: Negative for adenopathy. Psychiatric/Behavioral: Negative for agitation. Physical Examination :       Physical Exam  Constitutional:       General: He is not in acute distress. Appearance: Normal appearance. He is ill-appearing. He is not toxic-appearing or diaphoretic. HENT:      Head: Normocephalic and atraumatic. Nose: Nose normal. No congestion. Mouth/Throat:      Mouth: Mucous membranes are moist.   Eyes:      Conjunctiva/sclera: Conjunctivae normal.   Neck:      Musculoskeletal: Neck supple. No neck rigidity or muscular tenderness. Cardiovascular:      Rate and Rhythm: Normal rate and regular rhythm. Heart sounds: Normal heart sounds. No friction rub. Pulmonary:      Effort: No respiratory distress. Breath sounds: Normal breath sounds. No stridor. No rhonchi. Abdominal:      Palpations: Abdomen is soft. Tenderness: There is no abdominal tenderness. Genitourinary:     Comments: No colon  Musculoskeletal:         General: No swelling or tenderness. Skin:     General: Skin is dry. Coloration: Skin is not jaundiced.    Neurological:      General: No focal deficit present. Mental Status: He is alert and oriented to person, place, and time. Mental status is at baseline. Psychiatric:         Mood and Affect: Mood normal.         Thought Content: Thought content normal.         Past Medical History:     Past Medical History:   Diagnosis Date    Hx SBO 2006       Past Surgical  History:     Past Surgical History:   Procedure Laterality Date    ABDOMINAL EXPLORATION SURGERY  2016    SBO -    APPENDECTOMY  2005    TONSILLECTOMY AND ADENOIDECTOMY  1996       Medications:      sodium chloride  20 mL Intravenous Once    pantoprazole  40 mg Oral QAM AC    enoxaparin  40 mg Subcutaneous BID    sodium chloride flush  10 mL Intravenous 2 times per day    remdesivir IVPB  100 mg Intravenous Q24H       Social History:     Social History     Socioeconomic History    Marital status:      Spouse name: Not on file    Number of children: Not on file    Years of education: Not on file    Highest education level: Not on file   Occupational History    Occupation: MANAGER     Employer: [employer] Financial resource strain: Not on file    Food insecurity     Worry: Not on file     Inability: Not on file   [employer] needs     Medical: Not on file     Non-medical: Not on file   Tobacco Use    Smoking status: Never Smoker    Smokeless tobacco: Never Used   Substance and Sexual Activity    Alcohol use:  Yes     Alcohol/week: 2.0 - 3.0 standard drinks     Types: 2 - 3 Cans of beer per week     Frequency: 2-4 times a month    Drug use: Never    Sexual activity: Yes     Partners: Female   Lifestyle    Physical activity     Days per week: 2 days     Minutes per session: 20 min    Stress: Not on file   Relationships    Social connections     Talks on phone: Not on file     Gets together: Not on file     Attends Christian service: Not on file     Active member of club or organization: Not on file     Attends meetings of clubs or organizations: Not on file     Relationship status: Not on file    Intimate partner violence     Fear of current or ex partner: Not on file     Emotionally abused: Not on file     Physically abused: Not on file     Forced sexual activity: Not on file   Other Topics Concern    Not on file   Social History Narrative    Not on file       Family History:     Family History   Problem Relation Age of Onset    Ovarian Cancer Paternal Aunt       Medical Decision Making:   I have independently reviewed/ordered the following labs:    CBC with Differential:   Recent Labs     11/17/20 0622 11/18/20  0718   WBC 5.9 5.0   HGB 16.1 15.2   HCT 46.1 43.3   PLT See Reflexed IPF Result 145     BMP:  Recent Labs     11/17/20  0622 11/18/20  0718    137   K 3.8 3.5*    99   CO2 21 24   BUN 8 9   CREATININE 0.65* 0.57*     Hepatic Function Panel:   Recent Labs     11/18/20 0718 11/19/20  0641   PROT 6.3* 6.4   LABALBU 3.7 3.5   BILIDIR 0.20 0.24   IBILI 0.39 0.43   BILITOT 0.59 0.67   ALKPHOS 48 60   ALT 35 52*   AST 43* 57*     No results for input(s): RPR in the last 72 hours. No results for input(s): HIV in the last 72 hours. No results for input(s): BC in the last 72 hours. Lab Results   Component Value Date    CREATININE 0.57 11/18/2020    GLUCOSE 116 11/18/2020       Detailed results: Thank you for allowing us to participate in the care of this patient. Please call with questions. This note is created with the assistance of a speech recognition program.  While intending to generate adocument that actually reflects the content of the visit, the document can still have some errors including those of syntax and sound a like substitutions which may escape proof reading. It such instances, actual meaningcan be extrapolated by contextual diversion.     Nubia Pierre MD  Office: [phone]  Perfect serve / office [phone]

## 2020-11-19 NOTE — PROGRESS NOTES
[facility]  Office: [address], DO, Cortez Radhar, DO, Robin Narrow, DO, Risa East Blood, DO, Aramis Dobbs MD, Eliceo Moore MD, Austin Garcia MD, Jonelle Kate MD, Silvina Do MD, Mona Beard MD, Edgardo Lara MD, Gisella Acevedo MD, Lambert Ahuja MD, Meredith Pruitt DO, Emory Gimenez MD, Isaiah Grande MD, Unruly Levy, DO, Sylvain Gómez MD,  Randell Hoang DO, Priti Beard MD, Charito Multani MD, Michael Benson, [facility], [facility] M[facility], CNP, Edna Gates, CNP, Laura Councilman, CNS, Tana Lara, CNP, Adolfo Louis, CNP, Katelyn Gutierrez, CNP, Ruthann Tapia, CNP, Sukhjinder Nguyen, CNP, Gary Mcbride PA-C, Jo Arango, [facility], Mathew Gonzales, CNP, Nova Child, CNP, Sharon Starch, CNP, Sharonda Copper, CNP, Talita Ellie, CNP         [facility]   [address] [facility]    Progress Note    11/19/2020    2:17 PM    Name:   Claudia Burroughs  MRN:     [id number]     Acct:      [de-identified]   Room:   2026/2026-01  IP Day:  3  Admit Date:  11/16/2020 10:32 AM    PCP:   Vinnie Schuler II  Code Status:  Full Code    Subjective:     C/C:   Chief Complaint   Patient presents with    Shortness of Breath    Chest Pain     Interval History Status: not changed. Denies cp/n/v  Feels a lot better today, less sob  Some dizziness  No further fevers    Brief History:     Per my partner: \"The patient is a 26 y.o. Non-/non  male who presents with Shortness of Breath and Chest Pain   and he is admitted to the hospital for the management of  Pneumonia due to COVID-19 virus. Patient was brought to emergency room by family after he had persistent fever for last 7 days. Patient was diagnosed with COVID-19 infection 4 days ago at [facility].  He reported high-grade fever at home and has been using Tylenol around-the-clock. Patient reported fatigue, low appetite, decrease in energy.   He reported that he was progressively getting short of breath. Patient is otherwise healthy and does not take any medications. Patient denies any allergies. He is non-smoker and does not vape. Patient drinks socially. He lives with his wife at home. Initial evaluation in emergency showed tachycardia 111, temperature 101 Fahrenheit, pulse ox low 90s and dropped to 89%, patient has been tachypneic. Lab evaluation showed hyponatremia 131, thrombocytopenia 111, lymphopenia 23. COVID-19 test was repeated and was positive. Chest x-ray showed bilateral atypical pneumonia. \"    Review of Systems:     Constitutional:  positive for chills, sweats; negative for fevers  Respiratory:  positive for cough, dyspnea on exertion, shortness of breath  Cardiovascular:  negative for chest pain, chest pressure/discomfort, lower extremity edema, palpitations  Gastrointestinal:  negative for abdominal pain, constipation, diarrhea, nausea, vomiting  Neurological:  positive for dizziness    Medications: Allergies: Allergies   Allergen Reactions    Penicillins Diarrhea and Nausea Only       Current Meds:   Scheduled Meds:    sodium chloride  20 mL Intravenous Once    pantoprazole  40 mg Oral QAM AC    enoxaparin  40 mg Subcutaneous BID    sodium chloride flush  10 mL Intravenous 2 times per day    remdesivir IVPB  100 mg Intravenous Q24H     Continuous Infusions:   PRN Meds: sodium chloride flush, potassium chloride **OR** potassium alternative oral replacement **OR** potassium chloride, magnesium sulfate, acetaminophen **OR** acetaminophen, polyethylene glycol, promethazine **OR** ondansetron, dextromethorphan-guaiFENesin, sodium chloride    Data:     Past Medical History:   has a past medical history of Hx SBO. Social History:   reports that he has never smoked. He has never used smokeless tobacco. He reports current alcohol use of about 2.0 - 3.0 standard drinks of alcohol per week. He reports that he does not use drugs.      Family History:   Family History Problem Relation Age of Onset    Ovarian Cancer Paternal Aunt        Vitals:  /79   Pulse 74   Temp 98.6 °F (37 °C) (Oral)   Resp 16   Ht 5' 10\" (1.778 m)   Wt 243 lb (110.2 kg)   SpO2 95%   BMI 34.87 kg/m²   Temp (24hrs), Av.5 °F (36.9 °C), Min:98.3 °F (36.8 °C), Max:98.9 °F (37.2 °C)    No results for input(s): POCGLU in the last 72 hours. I/O (24Hr):     Intake/Output Summary (Last 24 hours) at 2020 1417  Last data filed at 2020 0545  Gross per 24 hour   Intake 250 ml   Output --   Net 250 ml       Labs:  Hematology:  Recent Labs     20  0718   WBC 5.9 5.0   RBC 5.35 5.07   HGB 16.1 15.2   HCT 46.1 43.3   MCV 86.2 85.4   MCH 30.1 30.0   MCHC 34.9* 35.1*   RDW 11.9 12.0   PLT See Reflexed IPF Result 145   MPV NOT REPORTED 10.9   CRP 54.9*  --    INR 1.0  --    DDIMER 0.60  --      Chemistry:  Recent Labs     20  0622 20  0718    137   K 3.8 3.5*    99   CO2 21 24   GLUCOSE 90 116*   BUN 8 9   CREATININE 0.65* 0.57*   ANIONGAP 16 14   LABGLOM >60 >60   GFRAA >60 >60   CALCIUM 8.3* 8.6     Recent Labs     20  0620  0718 20  0641   PROT 6.1* 6.3* 6.4   LABALBU 3.5 3.7 3.5   AST 47* 43* 57*   ALT 35 35 52*   *  --   --    ALKPHOS 49 48 60   BILITOT 0.67 0.59 0.67   BILIDIR 0.23 0.20 0.24     ABG:No results found for: POCPH, PHART, PH, POCPCO2, UBB6KFQ, PCO2, POCPO2, PO2ART, PO2, POCHCO3, OAH4CED, HCO3, NBEA, PBEA, BEART, BE, THGBART, THB, UIB8NES, KIXB6FFQ, X4HIKSMO, O2SAT, FIO2  No results found for: SPECIAL  No results found for: CULTURE    Radiology:  Xr Chest Portable    Result Date: 2020  Bilateral ill-defined pulmonary opacities suggesting atypical pneumonia       Physical Examination:        General appearance:  alert, cooperative and no distress  Mental Status:  oriented to person, place and time and normal affect  Lungs:  clear to auscultation bilaterally, normal effort  Heart:  regular rate and rhythm, no murmur  Abdomen:  soft, nontender, nondistended, normal bowel sounds, no masses, hepatomegaly, splenomegaly  Extremities:  no edema, redness, tenderness in the calves  Skin:  no gross lesions, rashes, induration    Assessment:        Hospital Problems           Last Modified POA    * (Principal) Pneumonia due to COVID-19 virus 11/16/2020 Yes    Hyponatremia 11/16/2020 Yes    Thrombocytopenia ([address] 11/16/2020 Yes    Acute hypoxemic respiratory failure due to COVID-19 [facility]) 11/17/2020 Yes    Sepsis due to COVID-19 ([address] 11/17/2020 Yes                Plan:        1. Received plasma  2. remdesivir started day 4/5  3. start decadron also  4. Dc some unnecessary daily labs  5. Supplemental o2 as needed: currently on ra  6.  Possible discharge tomorrow if doing well    Fady Merritt Blood, DO  11/19/2020  2:17 PM

## 2020-11-20 VITALS
OXYGEN SATURATION: 93 % | WEIGHT: 240.4 LBS | BODY MASS INDEX: 34.41 KG/M2 | DIASTOLIC BLOOD PRESSURE: 63 MMHG | HEART RATE: 78 BPM | TEMPERATURE: 98.2 F | RESPIRATION RATE: 13 BRPM | SYSTOLIC BLOOD PRESSURE: 102 MMHG | HEIGHT: 70 IN

## 2020-11-20 LAB
ALBUMIN SERPL-MCNC: 3.6 G/DL (ref 3.5–5.2)
ALBUMIN/GLOBULIN RATIO: 1.2 (ref 1–2.5)
ALP BLD-CCNC: 71 U/L (ref 40–129)
ALT SERPL-CCNC: 67 U/L (ref 5–41)
AST SERPL-CCNC: 57 U/L
BILIRUB SERPL-MCNC: 0.76 MG/DL (ref 0.3–1.2)
BILIRUBIN DIRECT: 0.26 MG/DL
BILIRUBIN, INDIRECT: 0.5 MG/DL (ref 0–1)
FERRITIN: 2485 UG/L (ref 30–400)
GLOBULIN: ABNORMAL G/DL (ref 1.5–3.8)
TOTAL PROTEIN: 6.5 G/DL (ref 6.4–8.3)

## 2020-11-20 PROCEDURE — 6370000000 HC RX 637 (ALT 250 FOR IP): Performed by: INTERNAL MEDICINE

## 2020-11-20 PROCEDURE — 82728 ASSAY OF FERRITIN: CPT

## 2020-11-20 PROCEDURE — 99238 HOSP IP/OBS DSCHRG MGMT 30/<: CPT | Performed by: INTERNAL MEDICINE

## 2020-11-20 PROCEDURE — 6360000002 HC RX W HCPCS: Performed by: FAMILY MEDICINE

## 2020-11-20 PROCEDURE — 2580000003 HC RX 258: Performed by: FAMILY MEDICINE

## 2020-11-20 PROCEDURE — 6370000000 HC RX 637 (ALT 250 FOR IP): Performed by: FAMILY MEDICINE

## 2020-11-20 PROCEDURE — 94761 N-INVAS EAR/PLS OXIMETRY MLT: CPT

## 2020-11-20 PROCEDURE — 2500000003 HC RX 250 WO HCPCS: Performed by: INTERNAL MEDICINE

## 2020-11-20 PROCEDURE — 99232 SBSQ HOSP IP/OBS MODERATE 35: CPT | Performed by: INTERNAL MEDICINE

## 2020-11-20 PROCEDURE — 2580000003 HC RX 258: Performed by: INTERNAL MEDICINE

## 2020-11-20 PROCEDURE — 80076 HEPATIC FUNCTION PANEL: CPT

## 2020-11-20 PROCEDURE — 36415 COLL VENOUS BLD VENIPUNCTURE: CPT

## 2020-11-20 RX ADMIN — ENOXAPARIN SODIUM 40 MG: 40 INJECTION SUBCUTANEOUS at 09:12

## 2020-11-20 RX ADMIN — PANTOPRAZOLE SODIUM 40 MG: 40 TABLET, DELAYED RELEASE ORAL at 06:12

## 2020-11-20 RX ADMIN — SODIUM CHLORIDE, PRESERVATIVE FREE 10 ML: 5 INJECTION INTRAVENOUS at 09:13

## 2020-11-20 RX ADMIN — ACETAMINOPHEN 650 MG: 325 TABLET ORAL at 13:01

## 2020-11-20 RX ADMIN — REMDESIVIR 100 MG: 5 INJECTION INTRAVENOUS at 16:19

## 2020-11-20 ASSESSMENT — PAIN SCALES - GENERAL
PAINLEVEL_OUTOF10: 5
PAINLEVEL_OUTOF10: 0

## 2020-11-20 ASSESSMENT — ENCOUNTER SYMPTOMS
COUGH: 0
ABDOMINAL PAIN: 0
PHOTOPHOBIA: 0
SHORTNESS OF BREATH: 0
COLOR CHANGE: 1
EYE ITCHING: 0
APNEA: 0
EYE REDNESS: 0

## 2020-11-20 NOTE — PROGRESS NOTES
labs:  Labs:  WBC 5.9      LFT NL  Fibrinogen 484  Ferritin 2740 - 2556  crp 54    Micro:    Imaging:      I have personally reviewed the past medical history, past surgical history, medications, social history, and family history, and I haveupdated the database accordingly. Allergies:   Penicillins     Review of Systems:     Review of Systems   Constitutional: Negative for activity change, fatigue and fever. HENT: Negative for congestion. Eyes: Negative for photophobia, redness and itching. Respiratory: Negative for apnea, cough and shortness of breath. Cardiovascular: Negative for chest pain. Gastrointestinal: Negative for abdominal pain. Endocrine: Negative for polyphagia and polyuria. Genitourinary: Negative for dysuria and flank pain. Musculoskeletal: Negative for arthralgias. Skin: Positive for color change. Allergic/Immunologic: Negative for immunocompromised state. Neurological: Negative for dizziness. Hematological: Negative for adenopathy. Psychiatric/Behavioral: Negative for agitation. Physical Examination :       Physical Exam  Constitutional:       General: He is not in acute distress. Appearance: Normal appearance. He is not ill-appearing, toxic-appearing or diaphoretic. HENT:      Head: Normocephalic and atraumatic. Nose: Nose normal. No congestion. Mouth/Throat:      Mouth: Mucous membranes are moist.   Eyes:      Conjunctiva/sclera: Conjunctivae normal.   Neck:      Musculoskeletal: Neck supple. No neck rigidity or muscular tenderness. Cardiovascular:      Rate and Rhythm: Normal rate and regular rhythm. Heart sounds: Normal heart sounds. No friction rub. Pulmonary:      Effort: No respiratory distress. Breath sounds: Normal breath sounds. No stridor. No wheezing or rhonchi. Abdominal:      Palpations: Abdomen is soft. Tenderness: There is no abdominal tenderness.    Genitourinary:     Comments: No colon  Musculoskeletal:         General: No swelling or tenderness. Skin:     General: Skin is dry. Coloration: Skin is not jaundiced. Neurological:      General: No focal deficit present. Mental Status: He is alert and oriented to person, place, and time. Mental status is at baseline. Psychiatric:         Mood and Affect: Mood normal.         Thought Content: Thought content normal.         Past Medical History:     Past Medical History:   Diagnosis Date    Hx SBO 2006       Past Surgical  History:     Past Surgical History:   Procedure Laterality Date    ABDOMINAL EXPLORATION SURGERY  2016    SBO -    APPENDECTOMY  2005    TONSILLECTOMY AND ADENOIDECTOMY  1996       Medications:      sodium chloride  20 mL Intravenous Once    pantoprazole  40 mg Oral QAM AC    enoxaparin  40 mg Subcutaneous BID    sodium chloride flush  10 mL Intravenous 2 times per day    remdesivir IVPB  100 mg Intravenous Q24H       Social History:     Social History     Socioeconomic History    Marital status:      Spouse name: Not on file    Number of children: Not on file    Years of education: Not on file    Highest education level: Not on file   Occupational History    Occupation: MANAGER     Employer: [employer] Financial resource strain: Not on file    Food insecurity     Worry: Not on file     Inability: Not on file   Fileboard needs     Medical: Not on file     Non-medical: Not on file   Tobacco Use    Smoking status: Never Smoker    Smokeless tobacco: Never Used   Substance and Sexual Activity    Alcohol use:  Yes     Alcohol/week: 2.0 - 3.0 standard drinks     Types: 2 - 3 Cans of beer per week     Frequency: 2-4 times a month    Drug use: Never    Sexual activity: Yes     Partners: Female   Lifestyle    Physical activity     Days per week: 2 days     Minutes per session: 20 min    Stress: Not on file   Relationships    Social connections     Talks on phone: Not on [phone]

## 2020-11-20 NOTE — CARE COORDINATION
Discharge [address] [facility] Case Management Department  Written by: Brenda Shah RN    Patient Name: Rafal Murphy  Attending Provider: No att. providers found  Admit Date: 2020 10:32 AM  MRN: [id number]  Account: [de-identified]                     : 1990  Discharge Date: 2020      Disposition: home    Brenda Shah RN

## 2020-11-20 NOTE — PROGRESS NOTES
[facility]  Office: [address], DO, Abdelrahman Kras, DO, Mirian Harshil, DO, Becky Shannon Blood, DO, Brenda Capps MD, Ruth Rosado MD, Je Cordova MD, Ken Mendoza MD, Ayo Frost MD, Brian Gan MD, Lila Nath MD, Chaparrita Ventura MD, Lambert Moore MD, Nora Tamayo, DO, Lolly Sher MD, Willy Weems MD, Paulino Hernadez, DO, Santy Negron MD,  Yann Martins, DO, Willem Leiva MD, Juvenal Carrasco MD, Sheron Craig, [facility], [facility], CNP, Yaw Craig, CNP, Jessica Weinberg, CNS, Brook Barrios, CNP, Qamar Section, CNP, Rylee Culp, CNP, Tiara Queen, CNP, Belkys Solders, CNP, Israel Glez PA-C, Susan Estrella, [facility], Abisai Simpson, CNP, Orveronica Soda, CNP, Wyhortencia Husbands, CNP, Colletta Moros, CNP, Ángel Lim, [facility]   [address] [facility]    Progress Note    11/20/2020    2:34 PM    Name:   Nancy Osman  MRN:     [id number]     [place]:      [de-identified]   Room:   2026/2026-01   Day:  4  Admit Date:  11/16/2020 10:32 AM    PCP:   Odilia Wallis II  Code Status:  Full Code    Subjective:     C/C:   Chief Complaint   Patient presents with    Shortness of Breath    Chest Pain     Interval History Status: improved. Denies cp/n/v  Feels a lot better today, no sob  No further fevers    Brief History:     Per my partner: \"The patient is a 26 y.o. Non-/non  male who presents with Shortness of Breath and Chest Pain   and he is admitted to the hospital for the management of  Pneumonia due to COVID-19 virus. Patient was brought to emergency room by family after he had persistent fever for last 7 days. Patient was diagnosed with COVID-19 infection 4 days ago at [facility].  He reported high-grade fever at home and has been using Tylenol around-the-clock. Patient reported fatigue, low appetite, decrease in energy. He reported that he was progressively getting short of breath. Patient is otherwise healthy and does not take any medications. Patient denies any allergies. He is non-smoker and does not vape. Patient drinks socially. He lives with his wife at home. Initial evaluation in emergency showed tachycardia 111, temperature 101 Fahrenheit, pulse ox low 90s and dropped to 89%, patient has been tachypneic. Lab evaluation showed hyponatremia 131, thrombocytopenia 111, lymphopenia 23. COVID-19 test was repeated and was positive. Chest x-ray showed bilateral atypical pneumonia. \"    Review of Systems:     Constitutional:  positive for chills, sweats; negative for fevers  Respiratory:  positive for cough; negative for dyspnea on exertion, shortness of breath  Cardiovascular:  negative for chest pain, chest pressure/discomfort, lower extremity edema, palpitations  Gastrointestinal:  negative for abdominal pain, constipation, diarrhea, nausea, vomiting  Neurological:  positive for dizziness    Medications: Allergies: Allergies   Allergen Reactions    Penicillins Diarrhea and Nausea Only       Current Meds:   Scheduled Meds:    sodium chloride  20 mL Intravenous Once    pantoprazole  40 mg Oral QAM AC    enoxaparin  40 mg Subcutaneous BID    sodium chloride flush  10 mL Intravenous 2 times per day    remdesivir IVPB  100 mg Intravenous Q24H     Continuous Infusions:   PRN Meds: sodium chloride flush, potassium chloride **OR** potassium alternative oral replacement **OR** potassium chloride, magnesium sulfate, acetaminophen **OR** acetaminophen, polyethylene glycol, promethazine **OR** ondansetron, dextromethorphan-guaiFENesin, sodium chloride    Data:     Past Medical History:   has a past medical history of Hx SBO. Social History:   reports that he has never smoked. He has never used smokeless tobacco. He reports current alcohol use of about 2.0 - 3.0 standard drinks of alcohol per week. He reports that he does not use drugs.      Family History:   Family History Problem Relation Age of Onset    Ovarian Cancer Paternal Aunt        Vitals:  /63   Pulse 78   Temp 98.2 °F (36.8 °C) (Oral)   Resp 13   Ht 5' 10\" (1.778 m)   Wt 240 lb 6.4 oz (109 kg)   SpO2 93%   BMI 34.49 kg/m²   Temp (24hrs), Av.2 °F (36.8 °C), Min:97.6 °F (36.4 °C), Max:98.8 °F (37.1 °C)    No results for input(s): POCGLU in the last 72 hours. I/O (24Hr):     Intake/Output Summary (Last 24 hours) at 2020 1434  Last data filed at 2020 1219  Gross per 24 hour   Intake 1760 ml   Output --   Net 1760 ml       Labs:  Hematology:  Recent Labs     20  0718   WBC 5.0   RBC 5.07   HGB 15.2   HCT 43.3   MCV 85.4   MCH 30.0   MCHC 35.1*   RDW 12.0      MPV 10.9     Chemistry:  Recent Labs     20  0718      K 3.5*   CL 99   CO2 24   GLUCOSE 116*   BUN 9   CREATININE 0.57*   ANIONGAP 14   LABGLOM >60   GFRAA >60   CALCIUM 8.6     Recent Labs     20  0718 20  0641 20  0808   PROT 6.3* 6.4 6.5   LABALBU 3.7 3.5 3.6   AST 43* 57* 57*   ALT 35 52* 67*   ALKPHOS 48 60 71   BILITOT 0.59 0.67 0.76   BILIDIR 0.20 0.24 0.26     ABG:No results found for: POCPH, PHART, PH, POCPCO2, QKE9JRW, PCO2, POCPO2, PO2ART, PO2, POCHCO3, XBD4DED, HCO3, NBEA, PBEA, BEART, BE, THGBART, THB, PTR0CHI, WTOF9ZPY, R2DRJEDT, O2SAT, FIO2  No results found for: SPECIAL  No results found for: CULTURE    Radiology:  Xr Chest Portable    Result Date: 2020  Bilateral ill-defined pulmonary opacities suggesting atypical pneumonia       Physical Examination:        General appearance:  alert, cooperative and no distress  Mental Status:  oriented to person, place and time and normal affect  Lungs:  clear to auscultation bilaterally, normal effort  Heart:  regular rate and rhythm, no murmur  Abdomen:  soft, nontender, nondistended, normal bowel sounds, no masses, hepatomegaly, splenomegaly  Extremities:  no edema, redness, tenderness in the calves  Skin:  no gross lesions, rashes, induration    Assessment:        Hospital Problems           Last Modified POA    * (Principal) Pneumonia due to COVID-19 virus 11/16/2020 Yes    Hyponatremia 11/16/2020 Yes    Thrombocytopenia ([facility] 11/16/2020 Yes    Acute hypoxemic respiratory failure due to COVID-19 [facility]) 11/17/2020 Yes    Sepsis due to COVID-19 ([facility] 11/17/2020 Yes                Plan:        1. Received plasma  2. remdesivir started day 5/5  3. On decadron also-stopped  4. Will see if can give last dose remdesivir early, then dc home  5.  D/w Dr Laura Franklin Blood, DO  11/20/2020  2:34 PM

## 2020-11-20 NOTE — DISCHARGE SUMMARY
[facility]  Office: [address], DO, Lori Harris, DO, Mulugetarosie Boucher, DO, Lulusolo Trammell, DO, Alvaro Prince MD, Belle Moore MD, Juni Andersen MD, Meredith Soliman MD, Kevin Anna MD, Mo Castle MD, Elvia Alvares MD, Antonio Anaya MD, Lambert Foreman MD, Brayden Nash DO, Mirella Fishman MD, Hallie Sun MD, Jailene Scarce, DO, Saw Grimm MD,  Lynn Rota, DO, Jaron Welch MD, Natalie Campuzano MD, Alexyasmine Oliva, [facility], [facility]oss, CNP, Neha Vivas, CNP, Cristine Chappell, CNS, Rudi Torres, CNP, Rain Hsieh, CNP, Louie Sharma, CNP, Heddy Sacks, CNP, Vane Rivera, CNP, Judie Danielle PA-C, Jonathan Sifuentes, [facility], Jitendra Bailey, CNP, Reina Glass, CNP, Rob Guidry, CNP, Willian Dural, CNP, Madsauln Comfort, [address] [place] [address]    Discharge Summary     Patient ID: Preston Leung  :  1990   MRN: [id number]     ACCOUNT:  [de-identified]   Patient's PCP: Rodolfo Casey Date: 2020   Discharge Date: 2020     Length of Stay: 4  Code Status:  Full Code  Admitting Physician: Antonio Anaya MD  Discharge Physician: Darinel Trammell DO     Active Discharge Diagnoses:     Hospital Problem Lists:  Principal Problem:    Pneumonia due to COVID-19 virus  Active Problems:    Hyponatremia    Thrombocytopenia ([address]    Acute hypoxemic respiratory failure due to COVID-19 [facility])    [facility]  Resolved Problems:    * No resolved hospital problems. *      Admission Condition:  fair     Discharged Condition: good    Hospital Stay:     Hospital Course: Preston Leung is a 26 y.o. male who was admitted for the management of   Pneumonia due to COVID-19 virus , presented to ER with Shortness of Breath and Chest Pain    Admitted with hypoxia from covid 19 pneumonia. Started on remdesivir and seen by ID, given plasma, which seemed to help tremendously.   He was able to be weaned off o2 and discharged home. Briefly on decadron but this was stopped      Significant therapeutic interventions: see above    Significant Diagnostic Studies:   Labs / Micro:  CBC:   Lab Results   Component Value Date    WBC 5.0 11/18/2020    RBC 5.07 11/18/2020    HGB 15.2 11/18/2020    HCT 43.3 11/18/2020    MCV 85.4 11/18/2020    MCH 30.0 11/18/2020    MCHC 35.1 11/18/2020    RDW 12.0 11/18/2020     11/18/2020     CMP:    Lab Results   Component Value Date    GLUCOSE 116 11/18/2020     11/18/2020    K 3.5 11/18/2020    CL 99 11/18/2020    CO2 24 11/18/2020    BUN 9 11/18/2020    CREATININE 0.57 11/18/2020    ANIONGAP 14 11/18/2020    ALKPHOS 71 11/20/2020    ALT 67 11/20/2020    AST 57 11/20/2020    BILITOT 0.76 11/20/2020    LABALBU 3.6 11/20/2020    ALBUMIN 1.2 11/20/2020    LABGLOM >60 11/18/2020    GFRAA >60 11/18/2020    GFR      11/18/2020    GFR NOT REPORTED 11/18/2020    PROT 6.5 11/20/2020    CALCIUM 8.6 11/18/2020        Radiology:  Xr Chest Portable    Result Date: 11/16/2020  Bilateral ill-defined pulmonary opacities suggesting atypical pneumonia       Consultations:    Consults:     Final Specialist Recommendations/Findings:   IP CONSULT TO HOSPITALIST  IP CONSULT TO INFECTIOUS DISEASES      The patient was seen and examined on day of discharge and this discharge summary is in conjunction with any daily progress note from day of discharge.     Discharge plan:     Disposition: Home    Physician Follow Up:     Natasha Davila II  [address] [facility] [id number] [phone]    In 2 weeks         Requiring Further Evaluation/Follow Up POST HOSPITALIZATION/Incidental Findings: none    Diet: regular diet    Activity: As tolerated    Instructions to Patient: take medications as prescribed  Quarantine for 20 days total    Discharge Medications:      Medication List      CONTINUE taking these medications    omeprazole 20 MG delayed release capsule  Commonly known as:  PRILOSEC            No discharge procedures on file. Time Spent on discharge is  20 mins in patient examination, evaluation, counseling as well as medication reconciliation, prescriptions for required medications, discharge plan and follow up. Electronically signed by   Versa Severin Blood, DO  11/20/2020  2:36 PM      Thank you Dr. Jefferson Palencia II for the opportunity to be involved in this patient's care.

## 2020-11-21 ENCOUNTER — CARE COORDINATION (OUTPATIENT)
Dept: CASE MANAGEMENT | Age: 30
End: 2020-11-21

## 2020-11-21 NOTE — CARE COORDINATION
[facility] Transitions Initial Follow Up Call for COVID MONITORING (covid+) - call attempt day #2 to both contact numbers -Attempted initial 24 hour transitional call to patient. Left VM to return call directly to [phone]    Call within 2 business days of discharge: Yes    Patient: Garth Aase Patient : 1990   MRN: [id number]    Reason for Admission: COVID PNE  Discharge Date: 20   RARS: Readmission Risk Score: 10    COVID positive testing on   Covid risk factors: no known previous risk factors - currently PNE, SEPSIS    Last Discharge [address] [address]       Complaint Diagnosis Description Type Department Provider    20 Shortness of Breath; Chest Pain COVID-19 virus infection ED to Hosp-Admission (Discharged) (ADMITTED) [facility] 2 Gini Arias P Blood, DO; Mery Britton B...            Facility: [facility]    Non-face-to-face services provided:  Obtained and reviewed discharge summary and/or continuity of care documents      Marcial Ndiaye RN

## 2020-11-23 ENCOUNTER — CARE COORDINATION (OUTPATIENT)
Dept: CASE MANAGEMENT | Age: 30
End: 2020-11-23

## 2020-11-23 NOTE — CARE COORDINATION
[facility] Trans[facility] Initial COVID MONITORING Follow Up Call   Call within 2 business days of discharge: Yes    Patient: Richard Desai Patient : 1990   MRN: [id number]    Reason for Admission: COVID PNE  Discharge Date: 20   RARS: Readmission Risk Score: 10      Initial covid test in [facility] - 11/10 positive + positive  No known risk factors prior to testing positive    Last Discharge [facility]       Complaint Diagnosis Description Type Department Provider    20 Shortness of Breath; Chest Pain COVID-19 virus infection ED to Hosp-Admission (Discharged) (ADMITTED) [facility] 2 Augustus Trammell, DO; Ben Ramos. Spoke with: patient    Facility: [facility]  Non-face-to-face services provided:  Scheduled appointment with PCP-patient has called PCP & waiting call back - informed also of covid clinic number for hospital f/u  Obtained and reviewed discharge summary and/or continuity of care documents      Patient reports that he is the only one in his immediate family to test positive - he is remaining in quarantine for total of 20 days after initial testing on 11/10. Continues to be tired, minimal cough (\"not bad\") with no expectorant. Otherwise no remaining covid symptoms - says he is \"doing well. \"      Follow Up - patient is awaiting call back from PCP office to schedule  Informed of covid clinic contact number - receptive & will call to schedule if he needs to - waiting yet for PCP to return call. Patient is aware that he can get another covid test at clinic if needed. Challenges to be reviewed by the provider   Additional needs identified to be addressed with provider No  none    Discussed COVID-19 related testing which was available at this time. Test results were positive. 11/10 and   Patient informed of results, if available?  Yes         Method of communication with provider : chart routing    Advance Care Planning:   Does patient have an Advance Directive: reviewed and current. Was this a readmission? No  Patient stated reason for admission: SOB, COVID PNE  Patients top risk factors for readmission: medical condition    Care Transition Nurse (CTN) contacted the patient by telephone to perform post hospital discharge assessment. Verified name and  with patient as identifiers. Provided introduction to self, and explanation of the CTN role. CTN reviewed discharge instructions, medical action plan and red flags with patient who verbalized understanding. Patient given an opportunity to ask questions and does not have any further questions or concerns at this time. Were discharge instructions available to patient? Yes. Reviewed appropriate site of care based on symptoms and resources available to patient including: PCP and Urgent care clinics. The patient agrees to contact the PCP office for questions related to their healthcare. Medication reconciliation was performed with patient - no new meds    Covid Risk Education    Patient has following risk factors of: no known risk factors. Education provided regarding infection prevention, and signs and symptoms of COVID-19 and when to seek medical attention with patient who verbalized understanding. Discussed exposure protocols and quarantine From CDC: Are you at higher risk for severe illness?   and given an opportunity for questions and concerns. The patient agrees to contact the COVID-19 hotline [phone] or PCP office for questions related to COVID-19. For more information on steps you can take to protect yourself, see CDC's How to Protect Yourself     Patient/ given information for Alek Jacobo and agrees to enroll yes  Patient's preferred e-mail: [email or web address]  Patient's preferred phone number: [phone]    Discussed follow-up appointments. If no appointment was previously scheduled, appointment scheduling offered: Yes. Is follow up appointment scheduled within 7 days of discharge?  - waiting for PCP to return call to patient - patient also has # for covid clinic    Covid monitoring per [employer]  CTN provided contact information for future needs.       Nalini Perez, RN

## 2025-05-03 ENCOUNTER — APPOINTMENT (OUTPATIENT)
Dept: ULTRASOUND IMAGING | Age: 35
End: 2025-05-03
Payer: COMMERCIAL

## 2025-05-03 ENCOUNTER — HOSPITAL ENCOUNTER (EMERGENCY)
Age: 35
Discharge: HOME | End: 2025-05-03
Attending: EMERGENCY MEDICINE
Payer: COMMERCIAL

## 2025-05-03 VITALS
HEART RATE: 94 BPM | HEIGHT: 70 IN | SYSTOLIC BLOOD PRESSURE: 154 MMHG | DIASTOLIC BLOOD PRESSURE: 98 MMHG | OXYGEN SATURATION: 98 % | TEMPERATURE: 98.6 F | RESPIRATION RATE: 18 BRPM | BODY MASS INDEX: 35.79 KG/M2 | WEIGHT: 250 LBS

## 2025-05-03 DIAGNOSIS — N45.1 EPIDIDYMITIS, RIGHT: Primary | ICD-10-CM

## 2025-05-03 LAB
BACTERIA URNS QL MICRO: NORMAL
BILIRUB UR QL STRIP: NEGATIVE
CASTS #/AREA URNS LPF: NORMAL /LPF (ref 0–8)
CLARITY UR: CLEAR
COLOR UR: YELLOW
EPI CELLS #/AREA URNS HPF: NORMAL /HPF (ref 0–5)
GLUCOSE UR STRIP-MCNC: NEGATIVE MG/DL
HGB UR QL STRIP.AUTO: NEGATIVE
KETONES UR STRIP-MCNC: NEGATIVE MG/DL
LEUKOCYTE ESTERASE UR QL STRIP: NEGATIVE
NITRITE UR QL STRIP: NEGATIVE
PH UR STRIP: 7 [PH] (ref 5–8)
PROT UR STRIP-MCNC: NEGATIVE MG/DL
RBC #/AREA URNS HPF: NORMAL /HPF (ref 0–4)
SP GR UR STRIP: 1.01 (ref 1–1.03)
UROBILINOGEN UR STRIP-ACNC: NORMAL EU/DL (ref 0–1)
WBC #/AREA URNS HPF: NORMAL /HPF (ref 0–5)

## 2025-05-03 PROCEDURE — 99284 EMERGENCY DEPT VISIT MOD MDM: CPT

## 2025-05-03 PROCEDURE — 6370000000 HC RX 637 (ALT 250 FOR IP)

## 2025-05-03 PROCEDURE — 87086 URINE CULTURE/COLONY COUNT: CPT

## 2025-05-03 PROCEDURE — 6360000002 HC RX W HCPCS

## 2025-05-03 PROCEDURE — 76870 US EXAM SCROTUM: CPT

## 2025-05-03 PROCEDURE — 87491 CHLMYD TRACH DNA AMP PROBE: CPT

## 2025-05-03 PROCEDURE — 93976 VASCULAR STUDY: CPT

## 2025-05-03 PROCEDURE — 87591 N.GONORRHOEAE DNA AMP PROB: CPT

## 2025-05-03 PROCEDURE — 87661 TRICHOMONAS VAGINALIS AMPLIF: CPT

## 2025-05-03 PROCEDURE — 81001 URINALYSIS AUTO W/SCOPE: CPT

## 2025-05-03 PROCEDURE — 96372 THER/PROPH/DIAG INJ SC/IM: CPT

## 2025-05-03 RX ORDER — ACETAMINOPHEN 500 MG
1000 TABLET ORAL ONCE
Status: COMPLETED | OUTPATIENT
Start: 2025-05-03 | End: 2025-05-03

## 2025-05-03 RX ORDER — SULFAMETHOXAZOLE AND TRIMETHOPRIM 800; 160 MG/1; MG/1
1 TABLET ORAL ONCE
Status: COMPLETED | OUTPATIENT
Start: 2025-05-03 | End: 2025-05-03

## 2025-05-03 RX ORDER — KETOROLAC TROMETHAMINE 30 MG/ML
30 INJECTION, SOLUTION INTRAMUSCULAR; INTRAVENOUS ONCE
Status: COMPLETED | OUTPATIENT
Start: 2025-05-03 | End: 2025-05-03

## 2025-05-03 RX ORDER — SULFAMETHOXAZOLE AND TRIMETHOPRIM 800; 160 MG/1; MG/1
1 TABLET ORAL 2 TIMES DAILY
Qty: 20 TABLET | Refills: 0 | Status: SHIPPED | OUTPATIENT
Start: 2025-05-03 | End: 2025-05-03

## 2025-05-03 RX ORDER — SULFAMETHOXAZOLE AND TRIMETHOPRIM 800; 160 MG/1; MG/1
1 TABLET ORAL 2 TIMES DAILY
Qty: 20 TABLET | Refills: 0 | Status: SHIPPED | OUTPATIENT
Start: 2025-05-03 | End: 2025-05-13

## 2025-05-03 RX ADMIN — SULFAMETHOXAZOLE AND TRIMETHOPRIM 1 TABLET: 800; 160 TABLET ORAL at 21:01

## 2025-05-03 RX ADMIN — KETOROLAC TROMETHAMINE 30 MG: 30 INJECTION, SOLUTION INTRAMUSCULAR at 18:49

## 2025-05-03 RX ADMIN — ACETAMINOPHEN 1000 MG: 500 TABLET ORAL at 18:49

## 2025-05-03 ASSESSMENT — ENCOUNTER SYMPTOMS
BLOOD IN STOOL: 0
ABDOMINAL PAIN: 0
DIARRHEA: 0
VOMITING: 0
CONSTIPATION: 0
ABDOMINAL DISTENTION: 0
NAUSEA: 0

## 2025-05-03 ASSESSMENT — PAIN - FUNCTIONAL ASSESSMENT: PAIN_FUNCTIONAL_ASSESSMENT: 0-10

## 2025-05-03 ASSESSMENT — PAIN SCALES - GENERAL: PAINLEVEL_OUTOF10: 6

## 2025-05-03 NOTE — ED PROVIDER NOTES
[facility] ED  Emergency Department Encounter  Emergency Medicine Resident     Pt Name:Leonel Cates  MRN: [id number]  Birthdate 1990  Date of evaluation: 5/3/25  PCP:  Augustus Khalil II, MD  Note Started: 6:08 PM EDT      CHIEF COMPLAINT       Chief Complaint   Patient presents with    Groin Swelling     With pain, right side       HISTORY OF PRESENT ILLNESS  (Location/Symptom, Timing/Onset, Context/Setting, Quality, Duration, Modifying Factors, Severity.)      Leonel Cates is a 34 y.o. male who presents with right testicular swelling and pain.  He states that it started this morning.  States that he was at first dull, now much more sharp, worse when he ambulates.  He states that the pain is almost completely gone when he is sitting down however is unbearable when he is walking.  He denies trauma, denies dysuria, denies hematuria denies urethral discharge, denies history of diabetes, denies history of previous  surgery [other than circumcision and previous vasectomy with Dr. Gonzalez], does endorse a history of previous appendectomy, history of small bowel obstruction following appendectomy [>5 years ago].  He denies fever or chills.  He denies abdominal pain, denies constipation or diarrhea.  He denies new medications, he does have a history of GERD, is on PPI as well as finasteride.    PAST MEDICAL / SURGICAL / SOCIAL / FAMILY HISTORY      has a past medical history of Hx SBO.     has a past surgical history that includes Tonsillectomy and adenoidectomy (1996); Appendectomy (2005); and Abdominal exploration surgery (2016).    Social History     Socioeconomic History    Marital status:      Spouse name: Not on file    Number of children: Not on file    Years of education: Not on file    Highest education level: Not on file   Occupational History    Occupation: MANAGER     Employer: [employer]   Tobacco Use    Smoking status: Never    Smokeless tobacco: Never   Vaping Use    Vaping  Exam conducted with a chaperone present (Shahnaz, JUAN MIGUEL).   Constitutional:       General: He is not in acute distress.     Appearance: He is not toxic-appearing.   HENT:      Mouth/Throat:      Mouth: Mucous membranes are moist.   Eyes:      Extraocular Movements: Extraocular movements intact.      Pupils: Pupils are equal, round, and reactive to light.   Cardiovascular:      Rate and Rhythm: Normal rate and regular rhythm.      Pulses:           Radial pulses are 2+ on the right side and 2+ on the left side.   Pulmonary:      Effort: Pulmonary effort is normal. No respiratory distress.   Abdominal:      Palpations: Abdomen is soft.      Tenderness: There is no abdominal tenderness. There is no guarding.      Hernia: There is no hernia in the left inguinal area or right inguinal area.   Genitourinary:     Penis: Circumcised.       Testes: Cremasteric reflex is present.         Right: Tenderness and varicocele present. Cremasteric reflex is present.          Left: Tenderness not present. Cremasteric reflex is present.       Epididymis:      Right: Tenderness present.      Comments: Testes in normal lie, no significant overlying erythema, no concern for Van's.  No significant mass on palpation  Musculoskeletal:      Right lower leg: No edema.      Left lower leg: No edema.   Skin:     General: Skin is warm and dry.      Capillary Refill: Capillary refill takes less than 2 seconds.   Neurological:      Mental Status: He is alert and oriented to person, place, and time.      GCS: GCS eye subscore is 4. GCS verbal subscore is 5. GCS motor subscore is 6.      Sensory: No sensory deficit.      Motor: No weakness.       DDX/DIAGNOSTIC RESULTS / EMERGENCY DEPARTMENT COURSE / MDM     Medical Decision Making  34-year-old male presenting with a 1 day history of right testicular pain.  Testicle normal lie, not retracted, Mr. Hai reflex present bilaterally.  Very low suspicion for torsion however given the pain that the patient

## 2025-05-03 NOTE — ED PROVIDER NOTES
[facility]     Emergency Department     Faculty Attestation    I performed a history and physical examination of the patient and discussed management with the resident. I reviewed the resident’s note and agree with the documented findings and plan of care. Any areas of disagreement are noted on the chart. I was personally present for the key portions of any procedures. I have documented in the chart those procedures where I was not present during the key portions. I have reviewed the emergency nurses triage note. I agree with the chief complaint, past medical history, past surgical history, allergies, medications, social and family history as documented unless otherwise noted below. Documentation of the HPI, Physical Exam and Medical Decision Making performed by medical students or scribes is based on my personal performance of the HPI, PE and MDM. For Physician Assistant/ Nurse Practitioner cases/documentation I have personally evaluated this patient and have completed at least one if not all key elements of the E/M (history, physical exam, and MDM). Additional findings are as noted.    Vital signs:   Vitals:    05/03/25 1803   BP: (!) 154/98   Pulse: 94   Resp: 18   Temp: 98.6 °F (37 °C)   SpO2: 98%      34-year-old male with right testicle pain and swelling since this morning. Worse with ambulation. No dysuria, genital discharge, hematuria. No abdominal pain. No trauma.  On exam, the patient does have tenderness to palpation over the testicle.  Normal lie.  No overlying skin changes.  Plan for ultrasound.            Lakeshia Wade M.D,  Attending Emergency  Physician

## 2025-05-03 NOTE — ED NOTES
Pt arrived to ED with report of swelling to scrotum.   Pt states he thinks swelling is worse on right side.   Pt reports tenderness.   Pt denies any injuries.   Pt A&Ox4, RR even/unlabored, call light within reach

## 2025-05-04 LAB
MICROORGANISM SPEC CULT: NO GROWTH
SERVICE CMNT-IMP: NORMAL
SPECIMEN DESCRIPTION: NORMAL

## 2025-05-05 LAB
CHLAMYDIA DNA UR QL NAA+PROBE: NEGATIVE
N GONORRHOEA DNA UR QL NAA+PROBE: NEGATIVE
SOURCE: NORMAL
SPECIMEN DESCRIPTION: NORMAL
TRICHOMONAS VAGINALIS, MOLECULAR: NEGATIVE